# Patient Record
Sex: MALE | Race: OTHER | HISPANIC OR LATINO | Employment: OTHER | URBAN - METROPOLITAN AREA
[De-identification: names, ages, dates, MRNs, and addresses within clinical notes are randomized per-mention and may not be internally consistent; named-entity substitution may affect disease eponyms.]

---

## 2017-06-06 ENCOUNTER — TRANSCRIBE ORDERS (OUTPATIENT)
Dept: URGENT CARE | Age: 72
End: 2017-06-06

## 2017-06-06 ENCOUNTER — HOSPITAL ENCOUNTER (OUTPATIENT)
Dept: RADIOLOGY | Age: 72
Discharge: HOME | End: 2017-06-06
Payer: OTHER MISCELLANEOUS

## 2017-06-06 ENCOUNTER — OCCMED (OUTPATIENT)
Dept: URGENT CARE | Age: 72
End: 2017-06-06
Payer: OTHER MISCELLANEOUS

## 2017-06-06 DIAGNOSIS — T14.90XA INJURY: ICD-10-CM

## 2017-06-06 DIAGNOSIS — T14.90XA INJURY: Primary | ICD-10-CM

## 2017-06-06 PROCEDURE — 72100 X-RAY EXAM L-S SPINE 2/3 VWS: CPT

## 2017-06-06 PROCEDURE — G0382 LEV 3 HOSP TYPE B ED VISIT: HCPCS | Performed by: PREVENTIVE MEDICINE

## 2017-06-06 PROCEDURE — 99283 EMERGENCY DEPT VISIT LOW MDM: CPT | Performed by: PREVENTIVE MEDICINE

## 2017-06-06 PROCEDURE — 73030 X-RAY EXAM OF SHOULDER: CPT

## 2017-06-10 ENCOUNTER — APPOINTMENT (OUTPATIENT)
Dept: URGENT CARE | Age: 72
End: 2017-06-10
Payer: OTHER MISCELLANEOUS

## 2017-06-10 PROCEDURE — 99213 OFFICE O/P EST LOW 20 MIN: CPT | Performed by: FAMILY MEDICINE

## 2018-05-10 ENCOUNTER — TRANSCRIBE ORDERS (OUTPATIENT)
Dept: NEUROSURGERY | Facility: CLINIC | Age: 73
End: 2018-05-10

## 2018-05-10 DIAGNOSIS — M54.6 CHRONIC RIGHT-SIDED THORACIC BACK PAIN: Primary | ICD-10-CM

## 2018-05-10 DIAGNOSIS — G89.29 CHRONIC RIGHT-SIDED THORACIC BACK PAIN: Primary | ICD-10-CM

## 2018-05-23 ENCOUNTER — OFFICE VISIT (OUTPATIENT)
Dept: NEUROSURGERY | Facility: CLINIC | Age: 73
End: 2018-05-23
Payer: OTHER MISCELLANEOUS

## 2018-05-23 ENCOUNTER — DOCUMENTATION (OUTPATIENT)
Dept: NEUROSURGERY | Facility: CLINIC | Age: 73
End: 2018-05-23

## 2018-05-23 VITALS
TEMPERATURE: 97.7 F | HEART RATE: 70 BPM | DIASTOLIC BLOOD PRESSURE: 90 MMHG | HEIGHT: 64 IN | BODY MASS INDEX: 25.06 KG/M2 | SYSTOLIC BLOOD PRESSURE: 150 MMHG | WEIGHT: 146.8 LBS

## 2018-05-23 DIAGNOSIS — M79.651 RIGHT THIGH PAIN: ICD-10-CM

## 2018-05-23 DIAGNOSIS — IMO0001 TEAR OF RIGHT SUPRASPINATUS TENDON, INITIAL ENCOUNTER: ICD-10-CM

## 2018-05-23 DIAGNOSIS — G89.29 CHRONIC RIGHT SHOULDER PAIN: ICD-10-CM

## 2018-05-23 DIAGNOSIS — G89.29 CHRONIC LEFT-SIDED LOW BACK PAIN WITHOUT SCIATICA: ICD-10-CM

## 2018-05-23 DIAGNOSIS — M54.50 CHRONIC LEFT-SIDED LOW BACK PAIN WITHOUT SCIATICA: ICD-10-CM

## 2018-05-23 DIAGNOSIS — M47.816 LUMBAR SPONDYLOSIS: ICD-10-CM

## 2018-05-23 DIAGNOSIS — M25.511 CHRONIC RIGHT SHOULDER PAIN: ICD-10-CM

## 2018-05-23 DIAGNOSIS — R29.2 HYPERREFLEXIA: Primary | ICD-10-CM

## 2018-05-23 PROCEDURE — 99244 OFF/OP CNSLTJ NEW/EST MOD 40: CPT | Performed by: PHYSICIAN ASSISTANT

## 2018-05-23 NOTE — PATIENT INSTRUCTIONS
Have C-spine and L-spine MRIs completed  Have L-spine bending xray completed  Recommend evaluation with Orthopedics for right shoulder  Contact our office or present to hospital Emergency Room if experience worsening or new pain, sensory or motor change, bladder or bowel dysfunction, or other neurological change

## 2018-05-23 NOTE — LETTER
May 25, 2018     Rudolpho Bamberger, MD  [address]    Patient: Rebel Ricardo   YOB: 1945   Date of Visit: 5/23/2018       Dear Dr Shaw Hoyt:    Thank you for referring Rebel Ricardo to me for evaluation  Below are my notes for this consultation  If you have questions, please do not hesitate to call me  I look forward to following your patient along with you  Sincerely,        Kaden Ham PA-C        CC: No Recipients  Kaden Ham PA-C  5/24/2018  4:43 AM  Sign at close encounter  Assessment/Plan:    No problem-specific Assessment & Plan notes found for this encounter  Diagnoses and all orders for this visit:    Hyperreflexia  -     MRI cervical spine without contrast; Future  -     MRI thoracic spine without contrast; Future    Chronic left-sided low back pain without sciatica  -     XR spine lumbar minimum 4 views non injury; Future    Right thigh pain  -     XR spine lumbar minimum 4 views non injury; Future    Lumbar spondylosis  -     XR spine lumbar minimum 4 views non injury; Future    Chronic right shoulder pain  -     Ambulatory referral to Orthopedic Surgery; Future    Tear of right supraspinatus tendon, initial encounter  -     Ambulatory referral to Orthopedic Surgery; Future          Discussion/ Summary: This is a 81 y o  male  who presents for neurosurgical evaluation   Patient with complaints of right shoulder pain and low back pain since lifting a trash bag at work in June 2017  He has limited range of motion right shoulder  He demonstrates some findings of hyperreflexia on examination  Recommend further evaluation of such with MRI C-spine and MRI T-spine  L-spine MRI (10/5/17) demonstrates degenerative changes / spondylosis but no significant stenosis  Recommend bending lumbar xray to evaluate dynamic stability        An MRI Right shoulder (10/16/17) reports partial thickness tear of supraspinatus tendon and arthritic changes involving the acromioclavicular joint  He reports a right shoulder injection with pain management provider did not provide any relief of right shoulder pain  Referral for Orthopedic evaluation provided for eval of right shoulder  He is advised follow up in our office after completion of requested testing  Patient advised to contact our office or present to hospital Emergency Room if experience worsening or new pain, sensory or motor change, bladder or bowel dysfunction, or other neurological change  Patient and son expressed understanding and agreement   ___________________________________________________________________________________  Subjective:      Patient ID: Quiana Zarate is a 81 y o  male who presents for neurosurgical consultation  Patient with c/o of right shoulder pain and low back pain  Mr Yodit Soto is referred by Dr Indira Montejo (Pain Management)  Mr Yodit Soto is accompanied with his son who assisted with supplementation of history  Patient is a limited historian  Language line  #[id number] utilized for translation  HPI  Patient reports in June 2017 he began with right shoulder pain and low back pain after lifting a trash bag at work  He has L-spine MRI and right shoulder MRI completed in Oct  2017  Son reports Mr Yodit Soto participated in approximately 6 months of physical therapy with little relief and indicated Dr Indira Montejo had patient discontinue PT about 2 months ago  Patient reports a right shoulder injection did not alleviate his right shoulder pain  He denies seeing a Orthopedic provider  He reports a low back injection (steroid / marcaine /lidocaine injection L3, L4, L5, S1) on 10/26/17 provided some pain relief for a few days  Patient reports pain in right shoulder region and limited ROM with abduction of right shoulder  He denies numbness or tingling in his upper extremities      He describes constant left sided lower back pain  He reports low back pain can occur if sit, stand, or walk durations but finds most aggravating when lay down  He is unable to describe character of his back pain  He rates back pain currently as 6/10 on pain scale  PT and low back injection provided little relief of is pain  He reports waxing/waning pain in anterior left thigh region  Currently rates this pain as 6-7/10 on pain scale  Reports he is unaware of particular aggravating or alleviating factor  He denies any pain in left lower extremity distal to thigh  He denies RLE pain  He denies numbness, tinging, or weakness of lower extremities  He denies bladder or bowel dysfunction  He denies saddle paresthesia  The following portions of the patient's history were reviewed and updated as appropriate: allergies, current medications, past family history, past medical history, past social history and past surgical history  Review of Systems   Constitutional: Negative for fever  HENT: Negative  Respiratory: Negative for shortness of breath  Gastrointestinal:        Denies bowel dysfunction   Genitourinary: Negative for difficulty urinating  Musculoskeletal: Positive for back pain (low back pain)  Right shoulder pain   Neurological:        See HPI   Psychiatric/Behavioral: Negative for agitation  Objective:      /90 (BP Location: Left arm, Patient Position: Sitting, Cuff Size: Standard)   Pulse 70   Temp 97 7 °F (36 5 °C) (Tympanic)   Ht 5' 4" (1 626 m)   Wt 66 6 kg (146 lb 12 8 oz)   BMI 25 20 kg/m²           Physical Exam   Constitutional: He appears well-developed and well-nourished  No distress  Eyes: Conjunctivae are normal    Pulmonary/Chest: Effort normal    Musculoskeletal:        Right shoulder: He exhibits decreased range of motion  He exhibits no tenderness  Cervical back: He exhibits no tenderness and no deformity     Neurological:   Reflex Scores:       Tricep reflexes are 3+ on the right side and 3+ on the left side  Bicep reflexes are Right biceps reflex grade: +2-3  and Left biceps reflex grade: +2-3  Martha Mort Brachioradialis reflexes are 2+ on the right side and 2+ on the left side  Patellar reflexes are 3+ on the right side and 3+ on the left side  Achilles reflexes are 2+ on the right side  Psychiatric: He has a normal mood and affect  Neurologic Exam     Mental Status   Level of consciousness: alert    Motor Exam     Motor:  Shoulder abduction left 5/5 with FROM  Shoulder abduction right 4/5 -- limited ROM to about 70 degrees  Elbow flexion left 5/5 and right 4+/5 (right shoulder pain)  Elbow extension 5/5 bilaterally  Wrist flexion/extension 5/5 bilaterally  Finger  5/5 bilaterally  Hip flexion/abduction/adduction 5/5 bilaterally  Knee flexion/extension 5/5 bilaterally  Ankle DF/PF 5/5 bilaterally  Great toe DF 5/5 bilaterally  Sensory Exam   Vibration normal    Proprioception normal    Pinprick normal      Sensation to pinprick intact b/l UEs, torso, and b/l LEs  JPS and Vibratory sense intact b/l thumbs and great toes  Gait, Coordination, and Reflexes     Reflexes   Right brachioradialis: 2+  Left brachioradialis: 2+  Right biceps reflex grade: +2-3  Left biceps reflex grade: +2-3    Right triceps: 3+  Left triceps: 3+  Right patellar: 3+  Left patellar: 3+  Right achilles: 2+  Right plantar: normal  Left plantar: normal  Right Muñoz: absent  Left Muñoz: absent  Right ankle clonus: present  Left ankle clonus: present

## 2018-05-23 NOTE — PROGRESS NOTES
Assessment/Plan:    No problem-specific Assessment & Plan notes found for this encounter  Diagnoses and all orders for this visit:    Hyperreflexia  -     MRI cervical spine without contrast; Future  -     MRI thoracic spine without contrast; Future    Chronic left-sided low back pain without sciatica  -     XR spine lumbar minimum 4 views non injury; Future    Right thigh pain  -     XR spine lumbar minimum 4 views non injury; Future    Lumbar spondylosis  -     XR spine lumbar minimum 4 views non injury; Future    Chronic right shoulder pain  -     Ambulatory referral to Orthopedic Surgery; Future    Tear of right supraspinatus tendon, initial encounter  -     Ambulatory referral to Orthopedic Surgery; Future          Discussion/ Summary: This is a 81 y o  male  who presents for neurosurgical evaluation   Patient with complaints of right shoulder pain and low back pain since lifting a trash bag at work in June 2017  He has limited range of motion right shoulder  He demonstrates some findings of hyperreflexia on examination  Recommend further evaluation of such with MRI C-spine and MRI T-spine  L-spine MRI (10/5/17) demonstrates degenerative changes / spondylosis but no significant stenosis  Recommend bending lumbar xray to evaluate dynamic stability  An MRI Right shoulder (10/16/17) reports partial thickness tear of supraspinatus tendon and arthritic changes involving the acromioclavicular joint  He reports a right shoulder injection with pain management provider did not provide any relief of right shoulder pain  Referral for Orthopedic evaluation provided for eval of right shoulder  He is advised follow up in our office after completion of requested testing  Patient advised to contact our office or present to hospital Emergency Room if experience worsening or new pain, sensory or motor change, bladder or bowel dysfunction, or other neurological change       Patient and son expressed understanding and agreement   ___________________________________________________________________________________  Subjective:      Patient ID: Radha Munoz is a 81 y o  male who presents for neurosurgical consultation  Patient with c/o of right shoulder pain and low back pain  Mr Quinton Flores is referred by Dr Karen Monsivais (Pain Management)  Mr Quinton Flores is accompanied with his son who assisted with supplementation of history  Patient is a limited historian  Language line  #[id number] utilized for translation  HPI  Patient reports in June 2017 he began with right shoulder pain and low back pain after lifting a trash bag at work  He has L-spine MRI and right shoulder MRI completed in Oct  2017  Son reports Mr Quinton Flores participated in approximately 6 months of physical therapy with little relief and indicated Dr Karen Monsivais had patient discontinue PT about 2 months ago  Patient reports a right shoulder injection did not alleviate his right shoulder pain  He denies seeing a Orthopedic provider  He reports a low back injection (steroid / marcaine /lidocaine injection L3, L4, L5, S1) on 10/26/17 provided some pain relief for a few days  Patient reports pain in right shoulder region and limited ROM with abduction of right shoulder  He denies numbness or tingling in his upper extremities  He describes constant left sided lower back pain  He reports low back pain can occur if sit, stand, or walk durations but finds most aggravating when lay down  He is unable to describe character of his back pain  He rates back pain currently as 6/10 on pain scale  PT and low back injection provided little relief of is pain  He reports waxing/waning pain in anterior left thigh region  Currently rates this pain as 6-7/10 on pain scale  Reports he is unaware of particular aggravating or alleviating factor  He denies any pain in left lower extremity distal to thigh     He denies RLE pain     He denies numbness, tinging, or weakness of lower extremities  He denies bladder or bowel dysfunction  He denies saddle paresthesia  The following portions of the patient's history were reviewed and updated as appropriate: allergies, current medications, past family history, past medical history, past social history and past surgical history  Review of Systems   Constitutional: Negative for fever  HENT: Negative  Respiratory: Negative for shortness of breath  Gastrointestinal:        Denies bowel dysfunction   Genitourinary: Negative for difficulty urinating  Musculoskeletal: Positive for back pain (low back pain)  Right shoulder pain   Neurological:        See HPI   Psychiatric/Behavioral: Negative for agitation  Objective:      /90 (BP Location: Left arm, Patient Position: Sitting, Cuff Size: Standard)   Pulse 70   Temp 97 7 °F (36 5 °C) (Tympanic)   Ht 5' 4" (1 626 m)   Wt 66 6 kg (146 lb 12 8 oz)   BMI 25 20 kg/m²          Physical Exam   Constitutional: He appears well-developed and well-nourished  No distress  Eyes: Conjunctivae are normal    Pulmonary/Chest: Effort normal    Musculoskeletal:        Right shoulder: He exhibits decreased range of motion  He exhibits no tenderness  Cervical back: He exhibits no tenderness and no deformity  Neurological:   Reflex Scores:       Tricep reflexes are 3+ on the right side and 3+ on the left side  Bicep reflexes are Right biceps reflex grade: +2-3  and Left biceps reflex grade: +2-3  Garcia Brianne Brachioradialis reflexes are 2+ on the right side and 2+ on the left side  Patellar reflexes are 3+ on the right side and 3+ on the left side  Achilles reflexes are 2+ on the right side  Psychiatric: He has a normal mood and affect  Neurologic Exam     Mental Status   Level of consciousness: alert    Motor Exam     Motor:  Shoulder abduction left 5/5 with FROM    Shoulder abduction right 4/5 -- limited ROM to about 70 degrees  Elbow flexion left 5/5 and right 4+/5 (right shoulder pain)  Elbow extension 5/5 bilaterally  Wrist flexion/extension 5/5 bilaterally  Finger  5/5 bilaterally  Hip flexion/abduction/adduction 5/5 bilaterally  Knee flexion/extension 5/5 bilaterally  Ankle DF/PF 5/5 bilaterally  Great toe DF 5/5 bilaterally  Sensory Exam   Vibration normal    Proprioception normal    Pinprick normal      Sensation to pinprick intact b/l UEs, torso, and b/l LEs  JPS and Vibratory sense intact b/l thumbs and great toes  Gait, Coordination, and Reflexes     Reflexes   Right brachioradialis: 2+  Left brachioradialis: 2+  Right biceps reflex grade: +2-3  Left biceps reflex grade: +2-3    Right triceps: 3+  Left triceps: 3+  Right patellar: 3+  Left patellar: 3+  Right achilles: 2+  Right plantar: normal  Left plantar: normal  Right Muñoz: absent  Left Muñoz: absent  Right ankle clonus: present  Left ankle clonus: present

## 2018-06-07 ENCOUNTER — HOSPITAL ENCOUNTER (OUTPATIENT)
Dept: MRI IMAGING | Facility: HOSPITAL | Age: 73
Discharge: HOME | End: 2018-06-07
Payer: OTHER MISCELLANEOUS

## 2018-06-07 ENCOUNTER — HOSPITAL ENCOUNTER (OUTPATIENT)
Dept: MRI IMAGING | Facility: HOSPITAL | Age: 73
Discharge: HOME | End: 2018-06-07
Payer: COMMERCIAL

## 2018-06-07 DIAGNOSIS — R29.2 HYPERREFLEXIA: ICD-10-CM

## 2018-06-07 PROCEDURE — 72146 MRI CHEST SPINE W/O DYE: CPT

## 2018-06-07 PROCEDURE — 72141 MRI NECK SPINE W/O DYE: CPT

## 2018-06-08 ENCOUNTER — TELEPHONE (OUTPATIENT)
Dept: NEUROSURGERY | Facility: CLINIC | Age: 73
End: 2018-06-08

## 2018-06-08 NOTE — TELEPHONE ENCOUNTER
----- Message from Barbara Fong PA-C sent at 6/8/2018  2:19 PM EDT -----  Please call patient and arrange follow up visit (jaylin Gomez / neurosurgeon)  Please remind patient to have the previously requested Lumbar spine xray completed prior to the office visit  Thank you

## 2018-06-08 NOTE — TELEPHONE ENCOUNTER
----- Message from Radha Mohr PA-C sent at 6/8/2018  2:21 PM EDT -----  Please call patient and arrange follow up visit (jaylin Trujillo / neurosurgeon)  Please remind patient to have the previously requested Lumbar spine xray completed prior to the office visit  Thank you

## 2018-06-11 ENCOUNTER — OFFICE VISIT (OUTPATIENT)
Dept: OBGYN CLINIC | Facility: HOSPITAL | Age: 73
End: 2018-06-11
Payer: OTHER MISCELLANEOUS

## 2018-06-11 VITALS
HEIGHT: 64 IN | WEIGHT: 146.83 LBS | SYSTOLIC BLOOD PRESSURE: 191 MMHG | HEART RATE: 80 BPM | DIASTOLIC BLOOD PRESSURE: 101 MMHG | BODY MASS INDEX: 25.07 KG/M2

## 2018-06-11 DIAGNOSIS — IMO0001 TEAR OF RIGHT SUPRASPINATUS TENDON, INITIAL ENCOUNTER: ICD-10-CM

## 2018-06-11 DIAGNOSIS — G89.29 CHRONIC RIGHT SHOULDER PAIN: ICD-10-CM

## 2018-06-11 DIAGNOSIS — M75.01 ADHESIVE CAPSULITIS OF RIGHT SHOULDER: Primary | ICD-10-CM

## 2018-06-11 DIAGNOSIS — I16.0 HYPERTENSIVE URGENCY: ICD-10-CM

## 2018-06-11 DIAGNOSIS — M25.511 CHRONIC RIGHT SHOULDER PAIN: ICD-10-CM

## 2018-06-11 DIAGNOSIS — M48.02 FORAMINAL STENOSIS OF CERVICAL REGION: ICD-10-CM

## 2018-06-11 PROCEDURE — 99203 OFFICE O/P NEW LOW 30 MIN: CPT | Performed by: PHYSICIAN ASSISTANT

## 2018-06-11 NOTE — PROGRESS NOTES
Assessment/Plan   Diagnoses and all orders for this visit:    Adhesive capsulitis of right shoulder       -Follow up with Dr Violeta Marie for a surgical opinion    Tear of right supraspinatus tendon (partial), initial encounter  -     Ambulatory referral to Orthopedic Surgery    Hypertensive urgency       -To ER now  Cervical foraminal stenosis at C3-4       -Follow up with your neurosurgeon as planned          Subjective   Patient ID: Mane Quiroga is a 81 y o  male  Vitals:    06/11/18 1916   BP: (!) 191/101   Pulse: [de-identified]     82 yo male comes in for an evaluation of his right shoulder  He was injured at work about a year ago after lifting something heavy  In October, an MRI revealed a partial rotator cuff tear  He has been seeing Dr Damon Murray in pain management and reports that a shoulder injection and physical therapy have not been helpful  He is not working  He is also [facility] right sided C3-4 foraminal stenosis  He does not have a follow up scheduled yet with them, but agrees to call them today to schedule  He c/o right shoulder pain and severely decreased motion  It is difficult for him to do simple things like put on a hat or wash under his axilla  The pain occasionally radiates to the elbow and he has occasional numbness in the fingers (not now)  The shoulder pain is constant, dull, and moderate  Incidentally noted is hypertension  He has been diagnosed with that in the past but is new to the area and does not have a PCP here yet  No chest pain, headache, dizziness, or palpitations  He does c/o some blurriness especially when looking at something bright like a cell phone          The following portions of the patient's history were reviewed and updated as appropriate: allergies, current medications, past family history, past medical history, past social history, past surgical history and problem list     Review of Systems  Ortho Exam  Past Medical History:   Diagnosis Date    Low back pain     Right shoulder pain      No past surgical history on file  No family history on file  Social History     Occupational History    Not on file  Social History Main Topics    Smoking status: Never Smoker    Smokeless tobacco: Never Used    Alcohol use No    Drug use: No    Sexual activity: Not on file       Review of Systems   Constitutional: Negative  HENT: Negative  Eyes: Negative  Respiratory: Negative  Cardiovascular: Negative  Gastrointestinal: Negative  Endocrine: Negative  Genitourinary: Negative  Musculoskeletal: As below      Allergic/Immunologic: Negative  Neurological: Negative  Hematological: Negative  Psychiatric/Behavioral: Negative  Objective   Physical Exam      I have personally reviewed pertinent radiology reports in PACS   and      · Constitutional: Awake, Alert, Oriented  · Eyes: EOMI  · Psych: Mood and affect appropriate  · Heart: regular rate and rhythm  · Lungs: No audible wheezing  · Abdomen: soft  · Lymph: no lymphedema       right Shoulder:  - Appearance   No swelling, discoloration, deformity, or ecchymosis  - Palpation   No tenderness to palpation of the clavicle, AC joint, biceps tendon, scapula, or proximal humerus  - ROM   Flexion: 80, ER: 30 and IR: 0  - Motor   Internal and external rotation 5/5 with shoulder at side, flexion 5/5  - Special tests      - NVI distally

## 2018-06-11 NOTE — PATIENT INSTRUCTIONS
Call or follow up with any new or worsening symptoms as discussed  Schedule f/u visit with Neurosurg (as recommended by them) for your neck  To ER now for hypertensive urgency (blood pressure and vision)

## 2018-06-14 NOTE — TELEPHONE ENCOUNTER
06/14/2018 Called pt and spoke with him hes Citizen of [place] speaking - george spoke with him advised date and time of next apt and location and advised him he needs to get xryas prior - I also mailed him out apt card per his request

## 2018-06-14 NOTE — TELEPHONE ENCOUNTER
06/14/2018 Called pt and spoke with him hes [place] speaking - george spoke with him advised date and time of next apt and location and advised him he needs to get xryas prior - I also mailed him out apt card per his request

## 2018-07-10 ENCOUNTER — OFFICE VISIT (OUTPATIENT)
Dept: OBGYN CLINIC | Facility: MEDICAL CENTER | Age: 73
End: 2018-07-10
Payer: COMMERCIAL

## 2018-07-10 VITALS
WEIGHT: 142 LBS | BODY MASS INDEX: 24.24 KG/M2 | DIASTOLIC BLOOD PRESSURE: 85 MMHG | SYSTOLIC BLOOD PRESSURE: 157 MMHG | HEIGHT: 64 IN | HEART RATE: 65 BPM

## 2018-07-10 DIAGNOSIS — M75.01 ADHESIVE CAPSULITIS OF RIGHT SHOULDER: Primary | ICD-10-CM

## 2018-07-10 PROCEDURE — 99204 OFFICE O/P NEW MOD 45 MIN: CPT | Performed by: ORTHOPAEDIC SURGERY

## 2018-07-10 NOTE — PROGRESS NOTES
Orthopaedic Surgery - Office Note  Frankey Bolder (81 y o  male)   : 1945   MRN: [id number]  Encounter Date: 7/10/2018    Chief Complaint   Patient presents with    Right Shoulder - Pain       Assessment / Plan  Right shoulder adhesive capsulitis    · Patient was counseled and educated on his condition  We will send the patient to physical therapy to learn home exercises for right shoulder stretching and range of motion  He understands that he has continued developed a home program and do these exercises on his own  Return if symptoms worsen or fail to improve  History of Present Illness  Frankey Bolder is a 81 y o  male who presents for evaluation of right shoulder pain and stiffness  He sustained injury at work in 2017 of lifting up a trash bag  Has since had persistent right shoulder pain  It is localized to the anterior lateral aspect of shoulder is made worse with motion activity is relieved only by rest   He has developed stiffness in her shoulder and right course of physical therapy with minimal improvement and had a previous steroid injection which gave approximately 3 days of pain relief  Review of Systems  Pertinent items are noted in HPI  All other systems were reviewed and are negative  Physical Exam  /85   Pulse 65   Ht 5' 4" (1 626 m)   Wt 64 4 kg (142 lb)   BMI 24 37 kg/m²   Cons: Appears well  No apparent distress  Psych: Alert  Oriented x3  Mood and affect normal   Eyes: PERRLA, EOMI  Resp: Normal effort  No audible wheezing or stridor  CV: Palpable pulse  No discernable arrhythmia  No LE edema  Lymph:  No palpable cervical, axillary, or inguinal lymphadenopathy  Skin: Warm  No palpable masses  No visible lesions  Neuro: Normal muscle tone  Normal and symmetric DTR's  Right Shoulder Exam  Alignment / Posture:  right scapular protraction  Inspection:  No swelling  No erythema  No ecchymosis  Palpation:  acromial bursa tenderness    ROM: Shoulder FE 90  Shoulder ER 10  Shoulder IR SI joint  Shoulder AER 10  Shoulder AIR 15  Strength:  Supraspinatus 4+/5  Infraspinatus 5/5  Subscapularis 5/5  Stability:  No objective shoulder instability  Tests: (+) Neer  (+) Painful arc  (-) Bear hug  Neurovascular:  Sensation intact in Ax/R/M/U nerve distributions  Palpable radial pulse  Studies Reviewed  MRI of right shoulder - poor quality open MRI shows tendinosis of the rotator cuff tendons with no definitive tear    Procedures  No procedures today  Medical, Surgical, Family, and Social History  The patient's medical history, family history, and social history, were reviewed and updated as appropriate  Past Medical History:   Diagnosis Date    Low back pain     Right shoulder pain        History reviewed  No pertinent surgical history  Family History   Problem Relation Age of Onset    No Known Problems Mother        Social History     Occupational History    Not on file  Social History Main Topics    Smoking status: Never Smoker    Smokeless tobacco: Never Used    Alcohol use No    Drug use: No    Sexual activity: Not on file       Allergies   Allergen Reactions    Aspirin        No current outpatient prescriptions on file        Andrés Suarez MD    Scribe Attestation    I,:    am acting as a scribe while in the presence of the attending physician :        I,:    personally performed the services described in this documentation    as scribed in my presence :

## 2018-07-23 ENCOUNTER — OFFICE VISIT (OUTPATIENT)
Dept: FAMILY MEDICINE CLINIC | Facility: CLINIC | Age: 73
End: 2018-07-23
Payer: COMMERCIAL

## 2018-07-23 VITALS
BODY MASS INDEX: 24.75 KG/M2 | TEMPERATURE: 97.3 F | RESPIRATION RATE: 17 BRPM | HEIGHT: 64 IN | DIASTOLIC BLOOD PRESSURE: 90 MMHG | HEART RATE: 81 BPM | WEIGHT: 145 LBS | OXYGEN SATURATION: 99 % | SYSTOLIC BLOOD PRESSURE: 142 MMHG

## 2018-07-23 DIAGNOSIS — K42.9 UMBILICAL HERNIA WITHOUT OBSTRUCTION AND WITHOUT GANGRENE: Primary | ICD-10-CM

## 2018-07-23 PROCEDURE — 99213 OFFICE O/P EST LOW 20 MIN: CPT | Performed by: FAMILY MEDICINE

## 2018-07-23 NOTE — PROGRESS NOTES
Assessment/Plan:    Umbilical hernia without obstruction and without gangrene  - will place referral to general surgery   - no signs or symptoms of a strangulated hernia   - may take NSAIDS or tylenol for pain relief        Diagnoses and all orders for this visit:    Umbilical hernia without obstruction and without gangrene          Subjective: Patient complains of carly-umbilical pain for the past few weeks  He is unable to describe the quality of the pain  No nausea or vomiting  Pain is increased by eating  Nothing makes the pain better and he has not tried any OTC medications  Denies fever, chills, and weight loss  States that bowel movements have been normal- he goes everyday  Denies blood in the stool and melanotic stool  Denies epigastric pain and heart burn  Patient ID: Jesse Olivera is a 72 y o  male  HPI    The following portions of the patient's history were reviewed and updated as appropriate: He  has a past medical history of Low back pain and Right shoulder pain  Patient Active Problem List    Diagnosis Date Noted    Umbilical hernia without obstruction and without gangrene 07/23/2018    Low back pain 05/23/2018    Right shoulder pain 05/23/2018    Right thigh pain 05/23/2018     He  has no past surgical history on file  His family history includes No Known Problems in his mother  He  reports that he has never smoked  He has never used smokeless tobacco  He reports that he does not drink alcohol or use drugs  No current outpatient prescriptions on file  No current facility-administered medications for this visit  No current outpatient prescriptions on file prior to visit  No current facility-administered medications on file prior to visit  He is allergic to aspirin       Review of Systems   Constitutional: Negative  Respiratory: Negative for shortness of breath  Cardiovascular: Negative for chest pain and palpitations     Gastrointestinal: Positive for abdominal pain  Negative for blood in stool, constipation, diarrhea, nausea and vomiting  Genitourinary: Negative for hematuria  Skin: Negative for color change and pallor  Neurological: Negative for dizziness and headaches  Objective:      /90   Pulse 81   Temp (!) 97 3 °F (36 3 °C) (Temporal)   Resp 17   Ht 5' 4" (1 626 m)   Wt 65 8 kg (145 lb)   SpO2 99%   BMI 24 89 kg/m²          Physical Exam   Constitutional: He is oriented to person, place, and time  He appears well-developed and well-nourished  No distress  Eyes: Conjunctivae are normal  Pupils are equal, round, and reactive to light  Neck: Normal range of motion  Neck supple  Cardiovascular: Normal rate, regular rhythm and normal heart sounds  Pulmonary/Chest: Effort normal and breath sounds normal  No respiratory distress  Abdominal: Soft  Bowel sounds are normal  He exhibits no distension  There is tenderness  There is no rebound and no guarding  Patient has umbilical hernia that is able to be reduced with pressure; umbilicus becomes prominent when he goes from a lying to sitting position   Neurological: He is alert and oriented to person, place, and time  Skin: Skin is warm and dry  He is not diaphoretic

## 2018-07-23 NOTE — ASSESSMENT & PLAN NOTE
- will place referral to general surgery   - no signs or symptoms of a strangulated hernia   - may take NSAIDS or tylenol for pain relief

## 2018-07-26 ENCOUNTER — TELEPHONE (OUTPATIENT)
Dept: NEUROSURGERY | Facility: CLINIC | Age: 73
End: 2018-07-26

## 2018-07-30 ENCOUNTER — OFFICE VISIT (OUTPATIENT)
Dept: SURGERY | Facility: CLINIC | Age: 73
End: 2018-07-30
Payer: COMMERCIAL

## 2018-07-30 VITALS
TEMPERATURE: 98 F | HEIGHT: 64 IN | HEART RATE: 66 BPM | BODY MASS INDEX: 24.07 KG/M2 | WEIGHT: 141 LBS | RESPIRATION RATE: 18 BRPM | SYSTOLIC BLOOD PRESSURE: 159 MMHG | DIASTOLIC BLOOD PRESSURE: 88 MMHG

## 2018-07-30 DIAGNOSIS — K42.9 UMBILICAL HERNIA WITHOUT OBSTRUCTION AND WITHOUT GANGRENE: ICD-10-CM

## 2018-07-30 PROCEDURE — 99203 OFFICE O/P NEW LOW 30 MIN: CPT | Performed by: SURGERY

## 2018-07-30 NOTE — LETTER
July 30, 2018     Lizeth Edwards PA-C  [address]    Patient: Leonard Orozco   YOB: 1945   Date of Visit: 7/30/2018       Dear Dr Kerwin Mcfarland:    Thank you for referring Leonard Orozco to me for evaluation  Below are my notes for this consultation  If you have questions, please do not hesitate to call me  I look forward to following your patient along with you  Sincerely,        Neal Mosher MD        CC: No Recipients  Patsy Tony PA-C  7/30/2018  3:00 PM  Sign at close encounter  Assessment/Plan:   Leonard Orozco is a 72 y o male who is here for The encounter diagnosis was Umbilical hernia without obstruction and without gangrene  Patient with umbilical hernia for several years which has been increasing in pain and discomfort over the past month  Plan: open repair of Umbilical Hernia  Preoperative Clearance: None      - Patient has been instructed to avoid aspirin containing medications or non-steroidal anti-inflammatory drugs for SEVEN days preceding surgery  Imaging:      _____________________________________________      HPI:  Leonard Orozco is a 72 y o male who was referred for evaluation of Hernia (umbilical hernia)    Currently complaining of  initial Umbilical Hernia  worse with bending, lifting, standing,     no nausea and no vomiting,     regular bowel movement      Duration of pain or symptoms:  Intermittent and over 1 month      Prior abdominal surgery:   None      No results found for: WBC, HGB, HCT, MCV, PLT  No results found for: NA, K, CL, CO2, ANIONGAP, BUN, CREATININE, GLUCOSE, GLUF, CALCIUM, CORRECTEDCA, AST, ALT, ALKPHOS, PROT, ALBUMIN, BILITOT, EGFR  No results found for: INR, PROTIME        ROS:  General ROS: negative  negative for - chills, fatigue, fever or night sweats, weight loss  Respiratory ROS: no cough, shortness of breath, or wheezing  Cardiovascular ROS: no chest pain or dyspnea on exertion  Genito-Urinary ROS: no dysuria, trouble voiding, or hematuria  Musculoskeletal ROS: negative for - gait disturbance, joint pain or muscle pain  Neurological ROS: no TIA or stroke symptoms  Abdominal ROS: see HPI  GI ROS: see HPI  Skin ROS: no new rashes or lesions   Lymphatic ROS: no new adenopathy noted by pt  GYN ROS: see HPI, no new GYN history or bleeding noted  Psy ROS: no new mental or behavioral disturbances       Patient Active Problem List   Diagnosis    Low back pain    Right shoulder pain    Right thigh pain    Umbilical hernia without obstruction and without gangrene         Allergies: Aspirin    Meds:No current outpatient prescriptions on file  PMH:  Past Medical History:   Diagnosis Date    Low back pain     Right shoulder pain        PSH:  Past Surgical History:   Procedure Laterality Date    NO PAST SURGERIES         Family History   Problem Relation Age of Onset    No Known Problems Mother         reports that he has never smoked  He has never used smokeless tobacco  He reports that he does not drink alcohol or use drugs  PHYSICAL EXAM  General Appearance:    Alert, cooperative, no distress   Head:    Normocephalic without obvious abnormality   Eyes:    PERRL, conjunctiva/corneas clear, EOM's intact        Neck:   Supple, no adenopathy, no JVD   Back:     Symmetric, no spinal or CVA tenderness   Lungs:     Clear to auscultation bilaterally, no wheezing or rhonchi   Heart:    Regular rate and rhythm, S1 and S2 normal, no murmur   Abdomen:      abdomen is soft without significant tenderness, masses, organomegaly or guarding Bowel sounds are normal     umbilical hernia  The hernia is easily reducible and measures about 2 cm  Extremities:   Extremities normal  No clubbing, cyanosis or edema   Psych:   Normal Affect, AOx3  Neurologic:  Skin:   CNII-XII intact   Strength symmetric, speech intact    Warm, dry, intact, no visible rashes or lesions                   Informed consent for procedure was personally discussed, reviewed, and signed by Dr Piyush Fernandez  Discussion by Dr Piyush Fernandez was carried out regarding risks, benefits, and alternatives with the patient  Risks include but are not limited to:  bleeding, infection, and delayed wound healing or an open wound, pulmonary embolus, leaks from bowel or bile ducts or other viscus, transfusions, death  Discussed in further detail the more common complications and their rates of occurrence   was used if necessary  Patient expressed understanding of the issues discussed and wished/consented to proceed  All questions were answered by Dr Piyush Fernandez  Discussion performed between patient and the provider signing below  Signature:   Arna Simmonds, MD    Date: 7/30/2018 Time: 2:58 PM       Some portions of this record may have been generated with voice recognition software  There may be translation, syntax,  or grammatical errors  Occasional wrong word or "sound-a-like" substitutions may have occurred due to the inherent limitations of the voice recognition software  Read the chart carefully and recognize, using context, where substitutions may have occurred  If you have any questions, please contact the dictating provider for clarification or correction, as needed  This encounter has been coded by a non-certified coder

## 2018-07-30 NOTE — PROGRESS NOTES
Assessment/Plan:   Radha Munoz is a 72 y o male who is here for The encounter diagnosis was Umbilical hernia without obstruction and without gangrene  Very pleasant Malay-speaking  patient complaining of periumbilical pain associated with bulge  Plan: open repair of Umbilical Hernia  Preoperative Clearance: None      - Patient has been instructed to avoid aspirin containing medications or non-steroidal anti-inflammatory drugs for SEVEN days preceding surgery  Imaging:      _____________________________________________      HPI:  Radha Munoz is a 72 y o male who was referred for evaluation of Hernia (umbilical hernia)    Currently complaining of  initial Umbilical Hernia  worse with bending, lifting, standing,     no nausea and no vomiting,     regular bowel movement  Duration of pain or symptoms:  Intermittent and over 1 month      Prior abdominal surgery:   None      No results found for: WBC, HGB, HCT, MCV, PLT  No results found for: NA, K, CL, CO2, ANIONGAP, BUN, CREATININE, GLUCOSE, GLUF, CALCIUM, CORRECTEDCA, AST, ALT, ALKPHOS, PROT, ALBUMIN, BILITOT, EGFR  No results found for: INR, PROTIME        ROS:  General ROS: negative  negative for - chills, fatigue, fever or night sweats, weight loss  Respiratory ROS: no cough, shortness of breath, or wheezing  Cardiovascular ROS: no chest pain or dyspnea on exertion  Genito-Urinary ROS: no dysuria, trouble voiding, or hematuria  Musculoskeletal ROS: negative for - gait disturbance, joint pain or muscle pain  Neurological ROS: no TIA or stroke symptoms  Abdominal ROS: see HPI  GI ROS: see HPI  Skin ROS: no new rashes or lesions   Lymphatic ROS: no new adenopathy noted by pt     GYN ROS: see HPI, no new GYN history or bleeding noted  Psy ROS: no new mental or behavioral disturbances       Patient Active Problem List   Diagnosis    Low back pain    Right shoulder pain    Right thigh pain    Umbilical hernia without obstruction and without gangrene         Allergies: Aspirin    Meds:No current outpatient prescriptions on file  PMH:  Past Medical History:   Diagnosis Date    Low back pain     Right shoulder pain        PSH:  Past Surgical History:   Procedure Laterality Date    NO PAST SURGERIES         Family History   Problem Relation Age of Onset    No Known Problems Mother         reports that he has never smoked  He has never used smokeless tobacco  He reports that he does not drink alcohol or use drugs  PHYSICAL EXAM  General Appearance:    Alert, cooperative, no distress   Head:    Normocephalic without obvious abnormality   Eyes:    PERRL, conjunctiva/corneas clear, EOM's intact        Neck:   Supple, no adenopathy, no JVD   Back:     Symmetric, no spinal or CVA tenderness   Lungs:     Clear to auscultation bilaterally, no wheezing or rhonchi   Heart:    Regular rate and rhythm, S1 and S2 normal, no murmur   Abdomen:      abdomen is soft without significant tenderness, masses, organomegaly or guarding Bowel sounds are normal     umbilical hernia  The hernia is easily reducible and measures about 2 cm  Extremities:   Extremities normal  No clubbing, cyanosis or edema   Psych:   Normal Affect, AOx3  Neurologic:  Skin:   CNII-XII intact  Strength symmetric, speech intact    Warm, dry, intact, no visible rashes or lesions                   Informed consent for procedure was personally discussed, reviewed, and signed by Dr Raf West  Discussion by Dr Raf West was carried out regarding risks, benefits, and alternatives with the patient  Risks include but are not limited to:  bleeding, infection, and delayed wound healing or an open wound, pulmonary embolus, leaks from bowel or bile ducts or other viscus, transfusions, death  Discussed in further detail the more common complications and their rates of occurrence   was used if necessary    Patient expressed understanding of the issues discussed and wished/consented to proceed  All questions were answered by Dr Raf West  Discussion performed between patient and the provider signing below  Signature:   Lyssa Hatch MD    Date: 7/30/2018 Time: 2:58 PM       Some portions of this record may have been generated with voice recognition software  There may be translation, syntax,  or grammatical errors  Occasional wrong word or "sound-a-like" substitutions may have occurred due to the inherent limitations of the voice recognition software  Read the chart carefully and recognize, using context, where substitutions may have occurred  If you have any questions, please contact the dictating provider for clarification or correction, as needed  This encounter has been coded by a non-certified coder

## 2018-08-08 ENCOUNTER — ANESTHESIA EVENT (OUTPATIENT)
Dept: PERIOP | Facility: HOSPITAL | Age: 73
End: 2018-08-08
Payer: COMMERCIAL

## 2018-08-08 NOTE — PRE-PROCEDURE INSTRUCTIONS
No outpatient prescriptions have been marked as taking for the 8/9/18 encounter [facility] Encounter)

## 2018-08-09 ENCOUNTER — ANESTHESIA (OUTPATIENT)
Dept: PERIOP | Facility: HOSPITAL | Age: 73
End: 2018-08-09
Payer: COMMERCIAL

## 2018-08-09 ENCOUNTER — HOSPITAL ENCOUNTER (OUTPATIENT)
Facility: HOSPITAL | Age: 73
Setting detail: OUTPATIENT SURGERY
Discharge: HOME | End: 2018-08-09
Attending: SURGERY | Admitting: SURGERY
Payer: COMMERCIAL

## 2018-08-09 VITALS
BODY MASS INDEX: 24.75 KG/M2 | SYSTOLIC BLOOD PRESSURE: 155 MMHG | DIASTOLIC BLOOD PRESSURE: 86 MMHG | RESPIRATION RATE: 14 BRPM | OXYGEN SATURATION: 97 % | HEART RATE: 65 BPM | HEIGHT: 64 IN | TEMPERATURE: 97.7 F | WEIGHT: 145 LBS

## 2018-08-09 DIAGNOSIS — K42.9 UMBILICAL HERNIA WITHOUT OBSTRUCTION AND WITHOUT GANGRENE: Primary | ICD-10-CM

## 2018-08-09 PROCEDURE — 49585 PR REPAIR UMBILICAL HERN,5+Y/O,REDUC: CPT | Performed by: PHYSICIAN ASSISTANT

## 2018-08-09 PROCEDURE — 51798 US URINE CAPACITY MEASURE: CPT | Performed by: SURGERY

## 2018-08-09 PROCEDURE — 49585 PR REPAIR UMBILICAL HERN,5+Y/O,REDUC: CPT | Performed by: SURGERY

## 2018-08-09 PROCEDURE — C1781 MESH (IMPLANTABLE): HCPCS | Performed by: SURGERY

## 2018-08-09 DEVICE — BARD VENTRALEX HERNIA PATCH, 4.3 CM (1.7"), SMALL CIRCLE WITH STRAP
Type: IMPLANTABLE DEVICE | Site: UMBILICAL | Status: FUNCTIONAL
Brand: VENTRALEX

## 2018-08-09 RX ORDER — ONDANSETRON 4 MG/1
4 TABLET, ORALLY DISINTEGRATING ORAL EVERY 4 HOURS PRN
Status: DISCONTINUED | OUTPATIENT
Start: 2018-08-09 | End: 2018-08-09 | Stop reason: HOSPADM

## 2018-08-09 RX ORDER — KETOROLAC TROMETHAMINE 30 MG/ML
INJECTION, SOLUTION INTRAMUSCULAR; INTRAVENOUS AS NEEDED
Status: DISCONTINUED | OUTPATIENT
Start: 2018-08-09 | End: 2018-08-09 | Stop reason: SURG

## 2018-08-09 RX ORDER — GLYCOPYRROLATE 0.2 MG/ML
INJECTION INTRAMUSCULAR; INTRAVENOUS AS NEEDED
Status: DISCONTINUED | OUTPATIENT
Start: 2018-08-09 | End: 2018-08-09 | Stop reason: SURG

## 2018-08-09 RX ORDER — HYDROCODONE BITARTRATE AND ACETAMINOPHEN 5; 325 MG/1; MG/1
1 TABLET ORAL EVERY 4 HOURS PRN
Status: DISCONTINUED | OUTPATIENT
Start: 2018-08-09 | End: 2018-08-09 | Stop reason: HOSPADM

## 2018-08-09 RX ORDER — DEXAMETHASONE SODIUM PHOSPHATE 4 MG/ML
INJECTION, SOLUTION INTRA-ARTICULAR; INTRALESIONAL; INTRAMUSCULAR; INTRAVENOUS; SOFT TISSUE AS NEEDED
Status: DISCONTINUED | OUTPATIENT
Start: 2018-08-09 | End: 2018-08-09 | Stop reason: SURG

## 2018-08-09 RX ORDER — ONDANSETRON 2 MG/ML
INJECTION INTRAMUSCULAR; INTRAVENOUS AS NEEDED
Status: DISCONTINUED | OUTPATIENT
Start: 2018-08-09 | End: 2018-08-09 | Stop reason: SURG

## 2018-08-09 RX ORDER — ONDANSETRON 2 MG/ML
4 INJECTION INTRAMUSCULAR; INTRAVENOUS EVERY 4 HOURS PRN
Status: DISCONTINUED | OUTPATIENT
Start: 2018-08-09 | End: 2018-08-09 | Stop reason: HOSPADM

## 2018-08-09 RX ORDER — MEPERIDINE HYDROCHLORIDE 50 MG/ML
12.5 INJECTION INTRAMUSCULAR; INTRAVENOUS; SUBCUTANEOUS ONCE AS NEEDED
Status: DISCONTINUED | OUTPATIENT
Start: 2018-08-09 | End: 2018-08-09 | Stop reason: HOSPADM

## 2018-08-09 RX ORDER — HYDROCODONE BITARTRATE AND ACETAMINOPHEN 5; 325 MG/1; MG/1
1-2 TABLET ORAL EVERY 4 HOURS PRN
Qty: 30 TABLET | Refills: 0 | Status: SHIPPED | OUTPATIENT
Start: 2018-08-09 | End: 2018-09-07

## 2018-08-09 RX ORDER — ROCURONIUM BROMIDE 10 MG/ML
INJECTION, SOLUTION INTRAVENOUS AS NEEDED
Status: DISCONTINUED | OUTPATIENT
Start: 2018-08-09 | End: 2018-08-09 | Stop reason: SURG

## 2018-08-09 RX ORDER — ONDANSETRON 2 MG/ML
4 INJECTION INTRAMUSCULAR; INTRAVENOUS ONCE AS NEEDED
Status: DISCONTINUED | OUTPATIENT
Start: 2018-08-09 | End: 2018-08-09 | Stop reason: HOSPADM

## 2018-08-09 RX ORDER — FENTANYL CITRATE 50 UG/ML
INJECTION, SOLUTION INTRAMUSCULAR; INTRAVENOUS AS NEEDED
Status: DISCONTINUED | OUTPATIENT
Start: 2018-08-09 | End: 2018-08-09 | Stop reason: SURG

## 2018-08-09 RX ORDER — PROPOFOL 10 MG/ML
INJECTION, EMULSION INTRAVENOUS AS NEEDED
Status: DISCONTINUED | OUTPATIENT
Start: 2018-08-09 | End: 2018-08-09 | Stop reason: SURG

## 2018-08-09 RX ORDER — DEXTROSE AND SODIUM CHLORIDE 5; .45 G/100ML; G/100ML
80 INJECTION, SOLUTION INTRAVENOUS CONTINUOUS
Status: DISCONTINUED | OUTPATIENT
Start: 2018-08-09 | End: 2018-08-09 | Stop reason: HOSPADM

## 2018-08-09 RX ORDER — MORPHINE SULFATE 2 MG/ML
2 INJECTION, SOLUTION INTRAMUSCULAR; INTRAVENOUS EVERY 2 HOUR PRN
Status: DISCONTINUED | OUTPATIENT
Start: 2018-08-09 | End: 2018-08-09 | Stop reason: HOSPADM

## 2018-08-09 RX ORDER — SODIUM CHLORIDE 9 MG/ML
125 INJECTION, SOLUTION INTRAVENOUS CONTINUOUS
Status: DISCONTINUED | OUTPATIENT
Start: 2018-08-09 | End: 2018-08-09 | Stop reason: HOSPADM

## 2018-08-09 RX ORDER — MIDAZOLAM HYDROCHLORIDE 1 MG/ML
INJECTION INTRAMUSCULAR; INTRAVENOUS AS NEEDED
Status: DISCONTINUED | OUTPATIENT
Start: 2018-08-09 | End: 2018-08-09 | Stop reason: SURG

## 2018-08-09 RX ORDER — ACETAMINOPHEN 325 MG/1
650 TABLET ORAL EVERY 6 HOURS PRN
Status: DISCONTINUED | OUTPATIENT
Start: 2018-08-09 | End: 2018-08-09 | Stop reason: HOSPADM

## 2018-08-09 RX ORDER — FENTANYL CITRATE/PF 50 MCG/ML
50 SYRINGE (ML) INJECTION
Status: DISCONTINUED | OUTPATIENT
Start: 2018-08-09 | End: 2018-08-09 | Stop reason: HOSPADM

## 2018-08-09 RX ADMIN — SODIUM CHLORIDE 125 ML/HR: 0.9 INJECTION, SOLUTION INTRAVENOUS at 10:40

## 2018-08-09 RX ADMIN — FENTANYL CITRATE 50 MCG: 50 INJECTION, SOLUTION INTRAMUSCULAR; INTRAVENOUS at 07:44

## 2018-08-09 RX ADMIN — SODIUM CHLORIDE 125 ML/HR: 0.9 INJECTION, SOLUTION INTRAVENOUS at 06:20

## 2018-08-09 RX ADMIN — NEOSTIGMINE METHYLSULFATE 3 MG: 1 INJECTION, SOLUTION INTRAMUSCULAR; INTRAVENOUS; SUBCUTANEOUS at 08:18

## 2018-08-09 RX ADMIN — MIDAZOLAM 2 MG: 1 INJECTION INTRAMUSCULAR; INTRAVENOUS at 07:24

## 2018-08-09 RX ADMIN — PROPOFOL 200 MG: 10 INJECTION, EMULSION INTRAVENOUS at 07:28

## 2018-08-09 RX ADMIN — FENTANYL CITRATE 50 MCG: 50 INJECTION INTRAMUSCULAR; INTRAVENOUS at 08:59

## 2018-08-09 RX ADMIN — LIDOCAINE HYDROCHLORIDE 80 MG: 20 INJECTION, SOLUTION INTRAVENOUS at 07:28

## 2018-08-09 RX ADMIN — ROCURONIUM BROMIDE 35 MG: 10 INJECTION INTRAVENOUS at 07:28

## 2018-08-09 RX ADMIN — SODIUM CHLORIDE: 0.9 INJECTION, SOLUTION INTRAVENOUS at 07:52

## 2018-08-09 RX ADMIN — KETOROLAC TROMETHAMINE 30 MG: 30 INJECTION, SOLUTION INTRAMUSCULAR at 08:01

## 2018-08-09 RX ADMIN — GLYCOPYRROLATE 0.6 MG: 0.2 INJECTION, SOLUTION INTRAMUSCULAR; INTRAVENOUS at 08:18

## 2018-08-09 RX ADMIN — DEXAMETHASONE SODIUM PHOSPHATE 4 MG: 4 INJECTION, SOLUTION INTRAMUSCULAR; INTRAVENOUS at 07:39

## 2018-08-09 RX ADMIN — FENTANYL CITRATE 50 MCG: 50 INJECTION, SOLUTION INTRAMUSCULAR; INTRAVENOUS at 07:28

## 2018-08-09 RX ADMIN — CEFAZOLIN SODIUM 1000 MG: 1 SOLUTION INTRAVENOUS at 07:33

## 2018-08-09 RX ADMIN — ONDANSETRON HYDROCHLORIDE 4 MG: 2 INJECTION, SOLUTION INTRAVENOUS at 07:24

## 2018-08-09 NOTE — NURSING NOTE
9035-Spoke to Ronny POWER and advised pt voided 50cc then unmeasured urine in the toilet while he was trying to have bm  Per Lisa Masters, allow pt to go home and if he doesn't urinate within 6hrs to have him come back to ER

## 2018-08-09 NOTE — DISCHARGE INSTRUCTIONS
[facility] Instructions  Dr Claudia Felton MD, FACS    1  General: You will feel pulling sensations around the wound or funny aches and pains around the incisions  This is normal  Even minor surgery is a change in your body and this is your bodys way of reaction to it  If you have had abdominal surgery, it may help to support the incision with a small pillow or blanket for comfort when moving or coughing  2  Wound care: Make sure to remove the bandage in about 24 hours, unless instructed otherwise  You usually don't have to redress the wound after 24-48 hours, unless for comfort  Keep the incision clean and dry  Let air get to it  If this Steri-Strips fall off, just keep the wound clean  3  Water: You may shower over the wound, unless there are drain tubes left in place  Do not bathe or use a pool or hot tub until cleared by the physician  You may shower right over the staples or Steri-Strips and packing dry when you are done  4  Activity: You may go up and down stairs, walk as much as you are comfortable, but walk at least 3 times each day  If you have had abdominal surgery, do not lift anything heavier than 15 pounds for at least 2-4 weeks, unless cleared by the doctor  5  Diet: You may resume a regular diet  If you had a same-day surgery or overnight stay surgery, you may wish to eat lightly for a few days: soups, crackers, and sandwiches  You may resume a regular diet when ready  6  Medications: Resume all of your previous medications, unless told otherwise by the doctor  Avoid aspirin or ibuprofen (Advil, Motrin, etc ) products for 2-3 days after the date of surgery  You may, at that time, began to take them again  Tylenol is always fine, unless you are taking any narcotic pain medication containing Tylenol (such as Percocet, Darvocet, Vicodin, or anything containing acetaminophen)  Do not take Tylenol if you're taking these medications   You do not need to take the narcotic pain medications unless you are having significant pain and discomfort  7  Driving: You will need someone to drive you home on the day of surgery  Do not drive or make any important decisions while on narcotic pain medication or 24 hours and after anesthesia or sedation for surgery  Generally, you may drive when your off all narcotic pain medications  8  Upset Stomach: You may take Maalox, Tums, or similar items for an upset stomach  If your narcotic pain medication causes an upset stomach, do not take it on an empty stomach  Try taking it with at least some crackers or toast      9  Constipation: Patients often experienced constipation after surgery  You may take over-the-counter medication for this, such as Metamucil, Senokot, Dulcolax, milk of magnesia, etc  You may take a suppository unless you have had anorectal surgery such as a procedure on your hemorrhoids  If you experience significant nausea or vomiting after abdominal surgery, call the office before trying any of these medications  10  Call the office: If you are experiencing any of the following, fevers above 101 5°, significant nausea or vomiting, if the wound develops drainage and/or is excessive redness around the wound, or if you have significant diarrhea or other worsening symptoms  11  Pain: You may be given a prescription for pain  This will be given to the hospital, the day of surgery  12  Sexual Activity: You may resume sexual activity when you feel ready and comfortable and your incision is sealed and healed without apparent infection risk  13  Urination: If you haven't urinated in 6 hours, go directly to the ER for evaluation for urinary retention       [address]  [place], [address]                                                                                                           Phone: [phone]

## 2018-08-09 NOTE — DISCHARGE SUMMARY
Discharge Summary - Rebel Ricardo 72 y o  male MRN: [id number]    Unit/Bed#: OR [place] Encounter: [id number]      Pre-Operative Diagnosis: Pre-Op Diagnosis Codes:     * Umbilical hernia without obstruction and without gangrene [K42 9]    Post-Operative Diagnosis: Post-Op Diagnosis Codes:     * Umbilical hernia without obstruction and without gangrene [K42 9]    Procedures Performed:  Procedure(s):  REPAIR HERNIA UMBILICAL    Surgeon: Samir Trujillo MD    See H & P for full details of admission and Operative Note for full details of operations performed  Patient was seen and examined prior to discharge  Provisions for Follow-Up Care:  See After Visit Summary for information related to follow-up care and home orders  Disposition: Home, in stable condition  Planned Readmission: No    Discharge Medications:  See after visit summary for reconciled discharge medications provided to patient and family  Post Operative instructions: Reviewed with patient and/or family  Some portions of this record may have been generated with voice recognition software  There may be translation, syntax,  or grammatical errors  Occasional wrong word or "sound-a-like" substitutions may have occurred due to the inherent limitations of the voice recognition software  Read the chart carefully and recognize, using context, where substitutions may have occurred  If you have any questions, please contact the dictating provider for clarification or correction, as needed  This encounter has been coded by non certified Coder      Signature:   Samir Trujillo MD  Date: 8/9/2018 Time: 8:23 AM

## 2018-08-09 NOTE — ANESTHESIA PREPROCEDURE EVALUATION
Review of Systems/Medical History  Patient summary reviewed  Chart reviewed  No history of anesthetic complications     Cardiovascular  Hypertension ,    Pulmonary  Negative pulmonary ROS        GI/Hepatic  Negative GI/hepatic ROS          Negative  ROS        Endo/Other  Negative endo/other ROS      GYN       Hematology  Negative hematology ROS      Musculoskeletal  Back pain , lumbar pain,        Neurology   Psychology   Negative psychology ROS              Physical Exam    Airway    Mallampati score: II  TM Distance: >3 FB  Neck ROM: full     Dental       Cardiovascular  Rhythm: regular, Rate: normal, Cardiovascular exam normal    Pulmonary  Pulmonary exam normal Breath sounds clear to auscultation,     Other Findings        Anesthesia Plan  ASA Score- 2     Anesthesia Type-     Plan Factors-Patient not instructed to abstain from smoking on day of procedure  Patient did not smoke on day of surgery  Induction- intravenous  Postoperative Plan- Plan for postoperative opioid use  Informed Consent- Anesthetic plan and risks discussed with patient (DAUGHTER-IN-LAW TRANSLATING)

## 2018-08-09 NOTE — OP NOTE
REPAIR HERNIA UMBILICAL  Postoperative Note  PATIENT NAME: Jacek Maher  : 1945  MRN: [id number]  AL OR ROOM 07    Surgery Date: 2018      Consent:  The risks, benefits, and alternatives to the surgery were discussed with the patient and with the family prior to surgery, personally by Dr Kristyn Dawkins  If the consent was obtained by the physician assistant or other representative, the consent was reviewed once again personally by the operating physician  Common complications particular for this procedure as well as unusual complications were discussed, including but not limited to:  bleeding, wound infection, prolonged wound healing, open wounds, reoperation, leak from the bowel or viscus, leak from the bile duct or injury to adjacent or other organs or blood vessels in the abdomen  A  was used if necessary  The patient expressed understanding of the issues discussed and wished and consented to the procedure to proceed  All questions were answered  Dr Kristyn Dawkins personally discussed the informed consent with this patient  Pre operative diagnosis:   Umbilical hernia without obstruction and without gangrene [K42 9]    Operative Indications:  Symptomatic Umbilical Hernia    Operative Findings:  Small defect    Post operative diagnosis:  Post-Op Diagnosis Codes:     * Umbilical hernia without obstruction and without gangrene [K42 9]    Procedure:  Procedure(s):  REPAIR HERNIA UMBILICAL    Surgeon(s) and Role:     * Song Maki MD - Primary     * Ga Rai PA-C - Assisting    The Physician Assistant was medically necessary for surgical safety the case including suturing, retraction, and hemostasis  No qualified resident was available  I was present for the entire procedure  Drains:       Specimens:  * No specimens in log *    Estimated Blood Loss:   0 mL    Anesthesia Type:   Choice     Procedure: The patient was seen in the Holding Room   The risks, benefits, complications, treatment options, and expected outcomes were discussed with the patient  The possibilities of reaction to medication, pulmonary aspiration, perforation of viscus, bleeding, recurrent infection, the need for additional procedures, failure to diagnose a condition, and creating a complication requiring transfusion or operation were discussed with the patient  The patient concurred with the proposed plan, giving informed consent  The site of surgery properly noted/marked  The patient was taken to Operating Room  A Time Out was held and the above information confirmed  The patient was prepped and draped in a sterile fashion  An additional timeout was performed  An infraumbilical incision was made, dissection carried out to the hernia sac  Hernia sac was freed of its surrounding adhesions  The hernia sac was opened and its contents reduced  The sac was suture-ligated at its base using 2-0 Vicryl sutures  Excess hernia sac was excised and sent for pathology evaluation     If possible, the hernia sac was closed and a preperitoneal blunt dissection was carried out,  to place the patch in a Pre-Peritoneal  postion  A Ventralex mesh was placed into the defect and secured using interrupted 1 Prolene sutures in a circumferential fashion,  assuring there were no defects or gaps in the mesh  This was doubly checked to secure the mesh without any interrupting gaps or defects  The fascia was closed using a 4-1 technique using an 1 Prolene suture, incorporating the mesh in the fascia closure  The umbilicus was re-created using interrupted 2-0 Vicryl sutures  The subcutaneous tissues were closed using 2-0 and 3-0 Vicryl sutures and the skin closed using a 4-0 Monocryl subcuticular stitch  The wound was dressed, placing positive pressure in the umbilicus with a gauze pack  The wound was dressed with Steri-Strips and dressing  The patient tolerated the procedure well      A small Ventralex mesh was used in the repair  Instrument, sponge, and needle counts were correct prior to closure and at the conclusion of the case  Some portions of this records may have been generated with voice recognition software  There may be translation, syntax,  or grammatical errors  Occasional wrong word or "sound-a-like" substitutions may have occurred due to the inherent limitations of the voice recognition software  Read the chart carefully and recognize, using context, where substations may have occurred  If you have any questions, please contact the dictating provider for clarification or correction, as needed       Complications: None    Condition: Stable to PACU    SIGNATURE: Samir Trujillo MD   DATE: August 9, 2018   TIME: 8:23 AM

## 2018-08-09 NOTE — NURSING NOTE
1410-Pt stated he requested to go home  He stated he was urinating in little increments  Pt and daughter in law were both advised to come back to ER should he not be able to urinate at all or feels uncomfortable  Pt and daughter in law stated they understood   Pt urinated x2 in toilet unmeasured occurance after he voided 50cc in urinal

## 2018-08-09 NOTE — H&P (VIEW-ONLY)
Assessment/Plan:   Nii Rodriguez is a 72 y o male who is here for The encounter diagnosis was Umbilical hernia without obstruction and without gangrene  Very pleasant Bulgarian-speaking  patient complaining of periumbilical pain associated with bulge  Plan: open repair of Umbilical Hernia  Preoperative Clearance: None      - Patient has been instructed to avoid aspirin containing medications or non-steroidal anti-inflammatory drugs for SEVEN days preceding surgery  Imaging:      _____________________________________________      HPI:  Nii Rodriguez is a 72 y o male who was referred for evaluation of Hernia (umbilical hernia)    Currently complaining of  initial Umbilical Hernia  worse with bending, lifting, standing,     no nausea and no vomiting,     regular bowel movement  Duration of pain or symptoms:  Intermittent and over 1 month      Prior abdominal surgery:   None      No results found for: WBC, HGB, HCT, MCV, PLT  No results found for: NA, K, CL, CO2, ANIONGAP, BUN, CREATININE, GLUCOSE, GLUF, CALCIUM, CORRECTEDCA, AST, ALT, ALKPHOS, PROT, ALBUMIN, BILITOT, EGFR  No results found for: INR, PROTIME        ROS:  General ROS: negative  negative for - chills, fatigue, fever or night sweats, weight loss  Respiratory ROS: no cough, shortness of breath, or wheezing  Cardiovascular ROS: no chest pain or dyspnea on exertion  Genito-Urinary ROS: no dysuria, trouble voiding, or hematuria  Musculoskeletal ROS: negative for - gait disturbance, joint pain or muscle pain  Neurological ROS: no TIA or stroke symptoms  Abdominal ROS: see HPI  GI ROS: see HPI  Skin ROS: no new rashes or lesions   Lymphatic ROS: no new adenopathy noted by pt     GYN ROS: see HPI, no new GYN history or bleeding noted  Psy ROS: no new mental or behavioral disturbances       Patient Active Problem List   Diagnosis    Low back pain    Right shoulder pain    Right thigh pain    Umbilical hernia without obstruction and without gangrene         Allergies: Aspirin    Meds:No current outpatient prescriptions on file  PMH:  Past Medical History:   Diagnosis Date    Low back pain     Right shoulder pain        PSH:  Past Surgical History:   Procedure Laterality Date    NO PAST SURGERIES         Family History   Problem Relation Age of Onset    No Known Problems Mother         reports that he has never smoked  He has never used smokeless tobacco  He reports that he does not drink alcohol or use drugs  PHYSICAL EXAM  General Appearance:    Alert, cooperative, no distress   Head:    Normocephalic without obvious abnormality   Eyes:    PERRL, conjunctiva/corneas clear, EOM's intact        Neck:   Supple, no adenopathy, no JVD   Back:     Symmetric, no spinal or CVA tenderness   Lungs:     Clear to auscultation bilaterally, no wheezing or rhonchi   Heart:    Regular rate and rhythm, S1 and S2 normal, no murmur   Abdomen:      abdomen is soft without significant tenderness, masses, organomegaly or guarding Bowel sounds are normal     umbilical hernia  The hernia is easily reducible and measures about 2 cm  Extremities:   Extremities normal  No clubbing, cyanosis or edema   Psych:   Normal Affect, AOx3  Neurologic:  Skin:   CNII-XII intact  Strength symmetric, speech intact    Warm, dry, intact, no visible rashes or lesions                   Informed consent for procedure was personally discussed, reviewed, and signed by Dr Jenni Pryor  Discussion by Dr Jenni Pryor was carried out regarding risks, benefits, and alternatives with the patient  Risks include but are not limited to:  bleeding, infection, and delayed wound healing or an open wound, pulmonary embolus, leaks from bowel or bile ducts or other viscus, transfusions, death  Discussed in further detail the more common complications and their rates of occurrence   was used if necessary    Patient expressed understanding of the issues discussed and wished/consented to proceed  All questions were answered by Dr Seymour Sanders  Discussion performed between patient and the provider signing below  Signature:   Yordan Merida MD    Date: 7/30/2018 Time: 2:58 PM       Some portions of this record may have been generated with voice recognition software  There may be translation, syntax,  or grammatical errors  Occasional wrong word or "sound-a-like" substitutions may have occurred due to the inherent limitations of the voice recognition software  Read the chart carefully and recognize, using context, where substitutions may have occurred  If you have any questions, please contact the dictating provider for clarification or correction, as needed  This encounter has been coded by a non-certified coder

## 2018-08-10 ENCOUNTER — TELEPHONE (OUTPATIENT)
Dept: SURGERY | Facility: CLINIC | Age: 73
End: 2018-08-10

## 2018-08-10 ENCOUNTER — HOSPITAL ENCOUNTER (EMERGENCY)
Facility: HOSPITAL | Age: 73
Discharge: HOME | End: 2018-08-10
Payer: COMMERCIAL

## 2018-08-10 VITALS
WEIGHT: 145.5 LBS | BODY MASS INDEX: 24.98 KG/M2 | OXYGEN SATURATION: 98 % | HEART RATE: 60 BPM | SYSTOLIC BLOOD PRESSURE: 180 MMHG | RESPIRATION RATE: 16 BRPM | DIASTOLIC BLOOD PRESSURE: 92 MMHG | TEMPERATURE: 97 F

## 2018-08-10 DIAGNOSIS — K59.00 CONSTIPATION: ICD-10-CM

## 2018-08-10 DIAGNOSIS — R33.9 URINARY RETENTION: ICD-10-CM

## 2018-08-10 DIAGNOSIS — K42.9 UMBILICAL HERNIA WITHOUT OBSTRUCTION AND WITHOUT GANGRENE: Primary | ICD-10-CM

## 2018-08-10 DIAGNOSIS — N39.0 URINARY TRACT INFECTION: ICD-10-CM

## 2018-08-10 LAB
BACTERIA UR QL AUTO: ABNORMAL /HPF
BILIRUB UR QL STRIP: NEGATIVE
CLARITY UR: CLEAR
COLOR UR: YELLOW
GLUCOSE UR STRIP-MCNC: NEGATIVE MG/DL
HGB UR QL STRIP.AUTO: ABNORMAL
KETONES UR STRIP-MCNC: NEGATIVE MG/DL
LEUKOCYTE ESTERASE UR QL STRIP: ABNORMAL
NITRITE UR QL STRIP: NEGATIVE
NON-SQ EPI CELLS URNS QL MICRO: ABNORMAL /HPF
PH UR STRIP.AUTO: 6 [PH] (ref 4.5–8)
PROT UR STRIP-MCNC: NEGATIVE MG/DL
RBC #/AREA URNS AUTO: ABNORMAL /HPF
SP GR UR STRIP.AUTO: 1.01 (ref 1–1.03)
UROBILINOGEN UR QL STRIP.AUTO: 0.2 E.U./DL
WBC #/AREA URNS AUTO: ABNORMAL /HPF

## 2018-08-10 PROCEDURE — 87086 URINE CULTURE/COLONY COUNT: CPT

## 2018-08-10 PROCEDURE — 81001 URINALYSIS AUTO W/SCOPE: CPT

## 2018-08-10 PROCEDURE — 99283 EMERGENCY DEPT VISIT LOW MDM: CPT

## 2018-08-10 RX ORDER — CIPROFLOXACIN 250 MG/1
250 TABLET, FILM COATED ORAL EVERY 12 HOURS SCHEDULED
Qty: 14 TABLET | Refills: 0 | Status: SHIPPED | OUTPATIENT
Start: 2018-08-10 | End: 2018-08-17

## 2018-08-10 RX ORDER — DOCUSATE SODIUM 100 MG/1
100 CAPSULE, LIQUID FILLED ORAL 2 TIMES DAILY
Qty: 20 CAPSULE | Refills: 0 | Status: SHIPPED | OUTPATIENT
Start: 2018-08-10 | End: 2018-09-07

## 2018-08-10 RX ADMIN — MAGNESIUM HYDROXIDE 30 ML: 400 SUSPENSION ORAL at 14:12

## 2018-08-10 NOTE — DISCHARGE INSTRUCTIONS
Retención urinaria en hombres   LO QUE NECESITA SABER:   La retención urinaria es valentino condición que se presenta cuando la vejiga no se vacía completamente al [place]  INSTRUCCIONES SOBRE [facility]ITAL[facility]:   Medicamentos:   · Medicamentos,  puede ayudar a disminuir el tamaño de la próstata, combatir las infecciones y lo ayudan a orinar con más facilidad  · [place] basia medicamentos jens se le haya indicado  Consulte con rangel médico si usted malgorzata que rangel medicamento no le está ayudando o si presenta efectos secundarios  Infórmele si es alérgico a cualquier medicamento  Mantenga valentino lista actualizada de los [place], las vitaminas y los productos herbales que kaylah  Incluya los siguientes datos de los medicamentos: cantidad, frecuencia y motivo de administración  Traiga con usted la lista o los envases de la píldoras a basia citas de seguimiento  Lleve la lista de los medicamentos con usted en perry de valentino emergencia  Cuidado de la sonda de [place]:  Es posible que necesite un catéter de [place] luz un christelle de 2 semanas en casa  [employer] le darán valentino bolsa para la pierna más pequeña para recolectar la orina  Mantenga la bolsa debajo del nivel de rangel cintura  [place] va a prevenir que la orina fluya de regreso a rangel vejiga y cause valentino infección u otros problemas  También mantenga el tubo sin torceduras para que la orina drene apropiadamente  No jale del catéter  [place] puede provocarle dolor y sangrado y podría provocar que el catéter se salga  Consulte con rangel médico o urólogo para obtener [facility] cuidado de la sonda de [place]  Orine con frecuencia:  Cuando le retiren el catéter, no espere a tener la vejiga demasiado llena para orinar  Fije horarios regulares cada día para orinar  Orine shafer pronto sienta la necesidad de [place], o al menos cada 3 horas cuando esté despierto  No connor líquidos antes de irse a dormir  Orine cisco antes de irse a la cama    Programe valentino aminta con rangel médico o urólogo jens se le indique:  Anote basia preguntas para que se acuerde de hacerlas luz basia visitas  Comuníquese con upton médico o urólogo si:   · Usted tiene fiebre  · Siente dolor al [place] Lux  · Usted orina con destinee  · Usted tiene problemas con el catéter  · Usted tiene preguntas o inquietudes acerca de upton condición o cuidado  Regrese a la doug de emergencias si:   · Usted tiene dolor abdominal intenso  · Usted está respirando más rápido que lo normal     · [place] ritmo cardíaco es más rápido de lo normal     · [place] patricia, vahe, pies o tobillos están inflamados  © 2017 [address] [employer] Information is for End User's use only and may not be sold, redistributed or otherwise used for commercial purposes  All illustrations and images included in CareNotes® are the copyrighted property of A D A M , Inc  or Martir Hull  Esta información es sólo para uso en educación  [place] intención no es darle un consejo médico sobre enfermedades o tratamientos  Colsulte con upton Kelley Cranker farmacéutico antes de seguir cualquier régimen médico para saber si es seguro y efectivo para usted  Estreñimiento   LO QUE NECESITA SABER:   El estreñimiento sucede cuando usted tiene evacuaciones intestinales duras y secas o pasa más tiempo del normal entre ellas  INSTRUCCIONES SOBRE [facility]:   Regrese a la doug de emergencias si:   · Usted tiene destinee en upton evacuaciones intestinales  · Usted tiene fiebre y dolor abdominal con el estreñimiento  Pregúntele a upton Vee Rumps vitaminas y minerales son adecuados para usted  · El estreñimiento empeora  · Usted comienza a vomitar  · Usted tiene preguntas o inquietudes acerca de [place] condición o cuidado  Medicamentos:   · Un medicamento o un suplemento de fibra  podría ayudarle a ablandar las evacuaciones intestinales   Un laxante podría aflojar o ayudar a relajar basia intestinos para que pueda tener valentino evacuación intestinal  Janette Clear es probable que le den [place] para aumentar el líquido en basia intestinos  El líquido puede llegar a movilizar las evacuaciones intestinales a través de basia intestinos  · [place] basia medicamentos jens se le haya indicado  Consulte con rangel médico si usted malgorzata que rangel medicamento no le está ayudando o si presenta efectos secundarios  Infórmele si es alérgico a algún medicamento  Mantenga valentino lista actualizada de los [place], las vitaminas y los productos herbales que kaylah  Incluya los siguientes datos de los medicamentos: cantidad, frecuencia y motivo de administración  Traiga con usted la lista o los envases de la píldoras a basia citas de seguimiento  Lleve la lista de los medicamentos con usted en perry de valentino emergencia  Controle rangel estreñimiento:   · [place] líquidos jens se le haya indicado  Puede que necesite beber más líquidos para ayudar a ablandar las evacuaciones y evacuar el intestino  Pregunte cuánto líquido debe marquez cada día y cuáles líquidos son los más adecuados para usted  · Coma alimentos altos en fibras  [place] podría ayudar a disminuir el estreñimiento al aumentar el volumen de las evacuaciones intestinales  Alimentos altos en [address] frutas, vegetales, panes y cereales integrales, y frijoles  Rangel médico o dietista puede ayudarle a crear un plan alimenticio con alto contenido de [place]  · Realice actividad física con regularidad  La actividad física regular puede ayudarle a estimular basia intestinos  Pregunte cuáles son los ejercicios más adecuados para usted  · Programe valentino hora cada día para tener valentino evacuación intestinal   [place] podría ayudar a rangel cuerpo a acostumbrarse a tener evacuaciones intestinales regulares   Inclínese hacia adelante mientras está sentado en el inodoro para facilitar la expulsión de la evacuación intestinal  [place]éntese en el inodoro al menos por 10 minutos, incluso si usted no tiene valentino evacuación intestinal   Tesha Humera a basia consultas de control con upton médico según le indicaron  Anote basia preguntas para que se acuerde de hacerlas luz basia visitas  © 2017 [address] Information is for End User's use only and may not be sold, redistributed or otherwise used for commercial purposes  All illustrations and images included in CareNotes® are the copyrighted property of A D A M  Inc  or Martir Hull  Esta información es sólo para uso en educación  [place] intención no es darle un consejo médico sobre enfermedades o tratamientos  Colsulte con upton [place] farmacéutico antes de seguir cualquier régimen médico para saber si es seguro y efectivo para usted

## 2018-08-10 NOTE — TELEPHONE ENCOUNTER
(late entry)    S/P UMB hernia repair = 8/9/18    Called and left message for patient to return call to office  Received return call from patients daughter  She states that patient has no V/F/N/C  BUT she states that patient hasn't urinated or had a bowel movement since surgery  Checked with PA-C  She states that when patient was discharged from hospital he was told to report to ED if he did not void in 6 hours  Reiterated same to daughter  She states that he refused to go to ED  I told her it was imperative that patient report to the ED immediately  She stated that she would try to get him to go  I told her that he he did not have a choice - he needs to go to ED  Patient states that he would go if that was his only option  I told her that it was  I told her that I would check on patient in computer and keep in touch with him/her today or Monday - depending on discharge

## 2018-08-10 NOTE — ED PROVIDER NOTES
History  Chief Complaint   Patient presents with    Urinary Frequency     Patient had umbilical hernia repair yesterday here  Reports urinary frequency, burning, urgency  Patient voiding in the middle of the waiting room in a hand urinal  Denies flank pain  Denies fevers  Daughter states was told to come to ED if unable to "empty bladder and pee consistently"     72year old male presents today complaining of abdominal distention and difficulty urinating since yesterday  Pt underwent open umbilical hernia repair yesterday with Dr Yaron Dodge  Pt says that after the surgery, he was able to urinate but it was a small amount  Has been urinating small amounts since yesterday but feels very bloated  Also says that he has not had a bowel movement in 3 days  Admits to one episode of vomiting shortly after taking his pain medication  Has not vomited since then and has not been taking his pain medication at all today because he has not had any pain  Pt denies history of urinary retention  Denies any dysuria or hematuria  Prior to Admission Medications   Prescriptions Last Dose Informant Patient Reported? Taking?    HYDROcodone-acetaminophen (NORCO) 5-325 mg per tablet   No No   Sig: Take 1-2 tablets by mouth every 4 (four) hours as needed for pain for up to 25 doses Max Daily Amount: 12 tablets      Facility-Administered Medications: None       Past Medical History:   Diagnosis Date    Hypertension     occ elevated per son - on no meds    Low back pain     Right shoulder pain     Umbilical hernia without obstruction or gangrene     Uses Welsh as primary spoken language     son translated thru translation line    Wears glasses        Past Surgical History:   Procedure Laterality Date    NO PAST SURGERIES      GA REPAIR UMBILICAL PVPU,7+C/K,EAVPV N/A 8/9/2018    Procedure: REPAIR HERNIA UMBILICAL;  Surgeon: Melquiades Prieto MD;  Location: [facility];  Service: General       Family History   Problem Relation Age of Onset    No Known Problems Mother      I have reviewed and agree with the history as documented  Social History   Substance Use Topics    Smoking status: Never Smoker    Smokeless tobacco: Never Used    Alcohol use Yes        Review of Systems   Gastrointestinal: Positive for abdominal distention and constipation  Genitourinary: Positive for difficulty urinating  All other systems reviewed and are negative  Physical Exam  Physical Exam   Constitutional: He appears well-developed and well-nourished  No distress  HENT:   Head: Normocephalic and atraumatic  Eyes: Conjunctivae are normal    Cardiovascular: Normal rate, regular rhythm and normal heart sounds  Pulmonary/Chest: Effort normal and breath sounds normal  No respiratory distress  He has no wheezes  Abdominal: Soft  He exhibits distension (mild)  There is no tenderness  There is no guarding  Abdominal binder in place, steri strips noted to surgical site   Musculoskeletal: Normal range of motion  Neurological: He is alert  Skin: Skin is warm and dry  Capillary refill takes less than 2 seconds  He is not diaphoretic  Psychiatric: He has a normal mood and affect   His behavior is normal        Vital Signs  ED Triage Vitals   Temperature Pulse Respirations Blood Pressure SpO2   08/10/18 1304 08/10/18 1304 08/10/18 1304 08/10/18 1304 08/10/18 1304   (!) 97 °F (36 1 °C) 82 16 138/78 99 %      Temp Source Heart Rate Source Patient Position - Orthostatic VS BP Location FiO2 (%)   08/10/18 1304 08/10/18 1304 08/10/18 1304 08/10/18 1459 --   Oral Monitor Sitting Right arm       Pain Score       08/10/18 1304       5           Vitals:    08/10/18 1304 08/10/18 1459   BP: 138/78 (!) 180/92   Pulse: 82 60   Patient Position - Orthostatic VS: Sitting Standing       Visual Acuity      ED Medications  Medications   magnesium hydroxide (MILK OF MAGNESIA) 400 mg/5 mL oral suspension 30 mL (30 mL Oral Given 8/10/18 1412) Diagnostic Studies  Results Reviewed     Procedure Component Value Units Date/Time    Urine Microscopic [[id number]]  (Abnormal) Collected:  08/10/18 1331    Lab Status:  Final result Specimen:  Urine from Urine, Other Updated:  08/10/18 1422     RBC, UA 1-2 (A) /hpf      WBC, UA 10-20 (A) /hpf      Epithelial Cells Occasional /hpf      Bacteria, UA Moderate (A) /hpf     Urine culture [[id number]] Collected:  08/10/18 1331    Lab Status: In process Specimen:  Urine from Urine, Other Updated:  08/10/18 1422    POCT urinalysis dipstick [[id number]]  (Abnormal) Resulted:  08/10/18 1326    Lab Status:  Final result Updated:  08/10/18 1327    ED Urine Macroscopic [[id number]]  (Abnormal) Collected:  08/10/18 1331    Lab Status:  Final result Specimen:  Urine Updated:  08/10/18 1323     Color, UA Yellow     Clarity, UA Clear     pH, UA 6 0     Leukocytes, UA Moderate (A)     Nitrite, UA Negative     Protein, UA Negative mg/dl      Glucose, UA Negative mg/dl      Ketones, UA Negative mg/dl      Urobilinogen, UA 0 2 E U /dl      Bilirubin, UA Negative     Blood, UA Moderate (A)     Specific [place], UA 1 010    Narrative:       CLINITEK RESULT                 No orders to display              Procedures  Procedures       Phone Contacts  ED Phone Contact    ED Course                               MDM  Number of Diagnoses or Management Options  Constipation:   Umbilical hernia without obstruction and without gangrene:   Urinary retention:   Urinary tract infection:   Diagnosis management comments: Pt reports immediate improvement after cath  Case discussed with Dr Chacha Anthony, who recommends insertion of torres catheter and discharge on colace   Called the outpatient surgical office and they are comfortable seeing the patient in the office on Monday for removal     CritCare Time    Disposition  Final diagnoses:   Umbilical hernia without obstruction and without gangrene   Urinary retention   Constipation   Urinary tract infection     Time reflects when diagnosis was documented in both MDM as applicable and the Disposition within this note     Time User Action Codes Description Comment    8/10/2018  2:04 PM Ila Altman Add [R76 4] Umbilical hernia without obstruction and without gangrene     8/10/2018  2:28 PM Mauri RAZA Add [R33 9] Urinary retention     8/10/2018  2:28 PM Mauri RAZA Add [K59 00] Constipation     8/10/2018  3:32 PM Ila Altman Add [N39 0] Urinary tract infection       ED Disposition     ED Disposition Condition Comment    Discharge  Nathanreina Ramosler discharge to home/self care  Condition at discharge: Good        Follow-up Information     Follow up With Specialties Details Why Alfa Oakes MD General Surgery Schedule an appointment as soon as possible for a visit  [address] [place] [address] [address] [facility]  [phone]            Discharge Medication List as of 8/10/2018  2:07 PM      START taking these medications    Details   docusate sodium (COLACE) 100 mg capsule Take 1 capsule (100 mg total) by mouth 2 (two) times a day, Starting Fri 8/10/2018, Print         CONTINUE these medications which have NOT CHANGED    Details   HYDROcodone-acetaminophen (NORCO) 5-325 mg per tablet Take 1-2 tablets by mouth every 4 (four) hours as needed for pain for up to 25 doses Max Daily Amount: 12 tablets, Starting Thu 8/9/2018, Print           No discharge procedures on file      ED Provider  Electronically Signed by           Willi Watkins PA-C  08/10/18 5187

## 2018-08-11 LAB — BACTERIA UR CULT: NORMAL

## 2018-08-13 ENCOUNTER — OFFICE VISIT (OUTPATIENT)
Dept: SURGERY | Facility: CLINIC | Age: 73
End: 2018-08-13

## 2018-08-13 VITALS
DIASTOLIC BLOOD PRESSURE: 82 MMHG | WEIGHT: 136 LBS | RESPIRATION RATE: 16 BRPM | HEIGHT: 64 IN | TEMPERATURE: 98 F | BODY MASS INDEX: 23.22 KG/M2 | SYSTOLIC BLOOD PRESSURE: 148 MMHG | HEART RATE: 74 BPM

## 2018-08-13 DIAGNOSIS — K42.9 UMBILICAL HERNIA WITHOUT OBSTRUCTION AND WITHOUT GANGRENE: Primary | ICD-10-CM

## 2018-08-13 PROCEDURE — 99024 POSTOP FOLLOW-UP VISIT: CPT | Performed by: SURGERY

## 2018-08-13 NOTE — LETTER
August 13, 2018     Hermilo Toussaint, [address]  [address]    Patient: Dennie Sero   YOB: 1945   Date of Visit: 8/13/2018       Dear Dr Mike Toro:    Thank you for referring Dennie Sero to me for evaluation  Below are my notes for this consultation  If you have questions, please do not hesitate to call me  I look forward to following your patient along with you  Sincerely,        Tony Shoemaker MD        CC: No Recipients  Tony Shoemaker MD  8/13/2018 11:27 AM  Sign at close encounter  Assessment/Plan:   Dennie Sero is a 72 y o male who comes in today for postoperative check after  on   Postop umbilical hernia repair with urinary retention  Required a Alcaraz catheter in the ER, and now he is here for removal       He knows if this recurs or he cannot void within 4-6 hours that he needs to go back to the ER for another catheter  If it happens a 2nd time, we recommend Urology consult before removing the catheter  Postoperative restrictions reviewed, including specific lifting and exercise restrictions  All questions answered  ______________________________________________________  HPI:  Dennie Sero is a 72 y o male who comes in today for postoperative check after recent  on   Currently doing well with some problems :  Urinary retention, seen in the ER on Friday  Alcaraz placed  , no fever or chills,no nausea and no vomiting  Reports   ROS:  General ROS: negative for - chills, fatigue, fever or night sweats, weight loss  Respiratory ROS: no cough, shortness of breath, or wheezing  Cardiovascular ROS: no chest pain or dyspnea on exertion  Genito-Urinary ROS: no dysuria, trouble voiding, or hematuria  Musculoskeletal ROS: negative for - gait disturbance, joint pain or muscle pain  Neurological ROS: no TIA or stroke symptoms  GI ROS: see HPI  Skin ROS: no new rashes or lesions   Lymphatic ROS: no new adenopathy noted by pt     GYN ROS: see HPI, no new GYN history or bleeding noted  Psy ROS: no new mental or behavioral disturbances       Patient Active Problem List   Diagnosis    Low back pain    Right shoulder pain    Right thigh pain    Umbilical hernia without obstruction and without gangrene         Aspirin      Current Outpatient Prescriptions:     ciprofloxacin (CIPRO) 250 mg tablet, Take 1 tablet (250 mg total) by mouth every 12 (twelve) hours for 7 days, Disp: 14 tablet, Rfl: 0    docusate sodium (COLACE) 100 mg capsule, Take 1 capsule (100 mg total) by mouth 2 (two) times a day, Disp: 20 capsule, Rfl: 0    HYDROcodone-acetaminophen (NORCO) 5-325 mg per tablet, Take 1-2 tablets by mouth every 4 (four) hours as needed for pain for up to 25 doses Max Daily Amount: 12 tablets, Disp: 30 tablet, Rfl: 0    Past Medical History:   Diagnosis Date    Hypertension     occ elevated per son - on no meds    Low back pain     Right shoulder pain     Umbilical hernia without obstruction or gangrene     Uses Turkish as primary spoken language     son translated thru translation line    Wears glasses        Past Surgical History:   Procedure Laterality Date    NO PAST SURGERIES      LA REPAIR UMBILICAL NKIB,5+H/B,PIPXU N/A 8/9/2018    Procedure: REPAIR HERNIA UMBILICAL;  Surgeon: Syeda Moody MD;  Location: [facility];  Service: General       Family History   Problem Relation Age of Onset    No Known Problems Mother         reports that he has never smoked  He has never used smokeless tobacco  He reports that he drinks alcohol  He reports that he does not use drugs  PHYSICAL EXAM  General: normal, cooperative, no distress  Abdominal: soft, nondistended or nontender  Incision: clean, dry, and intact and healing well    Foleyremoved without difficulty  Some portions of this record may have been generated with voice recognition software  There may be translation, syntax,  or grammatical errors   Occasional wrong word or "sound-a-like" substitutions may have occurred due to the inherent limitations of the voice recognition software  Read the chart carefully and recognize, using context, where substitutions may have occurred  If you have any questions, please contact the dictating provider for clarification or correction, as needed  This encounter has been coded by a non-certified coder         Cedrick Bonilla MD    Date: 8/13/2018 Time: 11:25 AM

## 2018-08-13 NOTE — TELEPHONE ENCOUNTER
Patient presented to office this am to have torres removed  He was instructed that if he did not urinate by 6:00 pm today, he'd have to report to the ED  If patient needs second torres, needs to have urology remove      If patient doesn't return to ED, keep regularly scheduled POPV for next week in the office     (will keep open to check on patient tomorrow)

## 2018-08-13 NOTE — PROGRESS NOTES
Assessment/Plan:   Frankey Bolder is a 72 y o male who comes in today for postoperative check after  on   Postop umbilical hernia repair with urinary retention  Required a Alcaraz catheter in the ER, and now he is here for removal       He knows if this recurs or he cannot void within 4-6 hours that he needs to go back to the ER for another catheter  If it happens a 2nd time, we recommend Urology consult before removing the catheter  Will see him back in 1 week for further postoperative care  For now he looks great this early in the postoperative course  Postoperative restrictions reviewed, including specific lifting and exercise restrictions  All questions answered  ______________________________________________________  HPI:  Frankey Bolder is a 72 y o male who comes in today for postoperative check after recent  on   Currently doing well with some problems :  Urinary retention, seen in the ER on Friday  Alcaraz placed  , no fever or chills,no nausea and no vomiting  Reports   ROS:  General ROS: negative for - chills, fatigue, fever or night sweats, weight loss  Respiratory ROS: no cough, shortness of breath, or wheezing  Cardiovascular ROS: no chest pain or dyspnea on exertion  Genito-Urinary ROS: no dysuria, trouble voiding, or hematuria  Musculoskeletal ROS: negative for - gait disturbance, joint pain or muscle pain  Neurological ROS: no TIA or stroke symptoms  GI ROS: see HPI  Skin ROS: no new rashes or lesions   Lymphatic ROS: no new adenopathy noted by pt     GYN ROS: see HPI, no new GYN history or bleeding noted  Psy ROS: no new mental or behavioral disturbances       Patient Active Problem List   Diagnosis    Low back pain    Right shoulder pain    Right thigh pain    Umbilical hernia without obstruction and without gangrene         Aspirin      Current Outpatient Prescriptions:     ciprofloxacin (CIPRO) 250 mg tablet, Take 1 tablet (250 mg total) by mouth every 12 (twelve) hours for 7 days, Disp: 14 tablet, Rfl: 0    docusate sodium (COLACE) 100 mg capsule, Take 1 capsule (100 mg total) by mouth 2 (two) times a day, Disp: 20 capsule, Rfl: 0    HYDROcodone-acetaminophen (NORCO) 5-325 mg per tablet, Take 1-2 tablets by mouth every 4 (four) hours as needed for pain for up to 25 doses Max Daily Amount: 12 tablets, Disp: 30 tablet, Rfl: 0    Past Medical History:   Diagnosis Date    Hypertension     occ elevated per son - on no meds    Low back pain     Right shoulder pain     Umbilical hernia without obstruction or gangrene     Uses Slovenian as primary spoken language     son translated thru translation line    Wears glasses        Past Surgical History:   Procedure Laterality Date    NO PAST SURGERIES      DE REPAIR UMBILICAL XXKN,3+L/I,[facility] N/A 8/9/2018    Procedure: REPAIR HERNIA UMBILICAL;  Surgeon: Melquiades Prieto MD;  Location: [facility];  Service: General       Family History   Problem Relation Age of Onset    No Known Problems Mother         reports that he has never smoked  He has never used smokeless tobacco  He reports that he drinks alcohol  He reports that he does not use drugs  PHYSICAL EXAM  General: normal, cooperative, no distress  Abdominal: soft, nondistended or nontender  Incision: clean, dry, and intact and healing well    Foleyremoved without difficulty  Some portions of this record may have been generated with voice recognition software  There may be translation, syntax,  or grammatical errors  Occasional wrong word or "sound-a-like" substitutions may have occurred due to the inherent limitations of the voice recognition software  Read the chart carefully and recognize, using context, where substitutions may have occurred  If you have any questions, please contact the dictating provider for clarification or correction, as needed  This encounter has been coded by a non-certified coder         Melquiades Prieto MD    Date: 8/13/2018 Time: 11:25 AM

## 2018-08-14 ENCOUNTER — HOSPITAL ENCOUNTER (EMERGENCY)
Facility: HOSPITAL | Age: 73
Discharge: HOME | End: 2018-08-14
Attending: EMERGENCY MEDICINE
Payer: COMMERCIAL

## 2018-08-14 ENCOUNTER — TELEPHONE (OUTPATIENT)
Dept: UROLOGY | Facility: AMBULATORY SURGERY CENTER | Age: 73
End: 2018-08-14

## 2018-08-14 VITALS
HEART RATE: 82 BPM | OXYGEN SATURATION: 97 % | SYSTOLIC BLOOD PRESSURE: 155 MMHG | DIASTOLIC BLOOD PRESSURE: 66 MMHG | HEIGHT: 64 IN | RESPIRATION RATE: 19 BRPM | WEIGHT: 143.7 LBS | TEMPERATURE: 98 F | BODY MASS INDEX: 24.53 KG/M2

## 2018-08-14 DIAGNOSIS — R33.9 URINARY RETENTION: Primary | ICD-10-CM

## 2018-08-14 LAB
BACTERIA UR QL AUTO: ABNORMAL /HPF
BILIRUB UR QL STRIP: NEGATIVE
CLARITY UR: ABNORMAL
COLOR UR: YELLOW
GLUCOSE UR STRIP-MCNC: NEGATIVE MG/DL
HGB UR QL STRIP.AUTO: ABNORMAL
KETONES UR STRIP-MCNC: NEGATIVE MG/DL
LEUKOCYTE ESTERASE UR QL STRIP: NEGATIVE
NITRITE UR QL STRIP: NEGATIVE
NON-SQ EPI CELLS URNS QL MICRO: ABNORMAL /HPF
PH UR STRIP.AUTO: 6.5 [PH] (ref 4.5–8)
PROT UR STRIP-MCNC: ABNORMAL MG/DL
RBC #/AREA URNS AUTO: ABNORMAL /HPF
SP GR UR STRIP.AUTO: 1.02 (ref 1–1.03)
UROBILINOGEN UR QL STRIP.AUTO: 0.2 E.U./DL
WBC #/AREA URNS AUTO: ABNORMAL /HPF

## 2018-08-14 PROCEDURE — 99283 EMERGENCY DEPT VISIT LOW MDM: CPT

## 2018-08-14 PROCEDURE — 81001 URINALYSIS AUTO W/SCOPE: CPT

## 2018-08-14 RX ORDER — TAMSULOSIN HYDROCHLORIDE 0.4 MG/1
0.4 CAPSULE ORAL
Qty: 10 CAPSULE | Refills: 0 | Status: SHIPPED | OUTPATIENT
Start: 2018-08-14 | End: 2018-09-07

## 2018-08-14 NOTE — TELEPHONE ENCOUNTER
Patient is scheduled for follow up with Urology on 8/20/18      POPV scheduled with Dr Dolores Singh for 8/24/18 (mailed reminder card to patients home address )

## 2018-08-14 NOTE — ED PROVIDER NOTES
History  Chief Complaint   Patient presents with    Urinary Retention     pt had torres catheter placed on friday, was removed yesterday morning at 1100, has been voiding small amounts, having suprapubic pain and distention       History provided by:  Patient   used: No    Medical Problem   Location:  Bladder  Quality:  Pain  Severity:  Severe  Onset quality:  Gradual  Timing:  Constant  Progression:  Worsening  Chronicity:  Recurrent  Context:  History of urinary retention  Relieved by:  Nothing  Worsened by:  Nothing  Ineffective treatments:  Voiding trial   Associated symptoms: abdominal pain    Associated symptoms: no chest pain, no cough, no diarrhea, no fatigue, no fever, no headaches, no nausea, no rash, no shortness of breath, no sore throat and no vomiting        Prior to Admission Medications   Prescriptions Last Dose Informant Patient Reported? Taking?    HYDROcodone-acetaminophen (NORCO) 5-325 mg per tablet  Family Member No No   Sig: Take 1-2 tablets by mouth every 4 (four) hours as needed for pain for up to 25 doses Max Daily Amount: 12 tablets   ciprofloxacin (CIPRO) 250 mg tablet  Family Member No No   Sig: Take 1 tablet (250 mg total) by mouth every 12 (twelve) hours for 7 days   docusate sodium (COLACE) 100 mg capsule  Family Member No No   Sig: Take 1 capsule (100 mg total) by mouth 2 (two) times a day      Facility-Administered Medications: None       Past Medical History:   Diagnosis Date    Hypertension     occ elevated per son - on no meds    Low back pain     Right shoulder pain     Umbilical hernia without obstruction or gangrene     Uses Burundian as primary spoken language     son translated thru translation line    Wears glasses        Past Surgical History:   Procedure Laterality Date    NO PAST SURGERIES      ND REPAIR UMBILICAL ACQW,7+C/Y,RRTJW N/A 8/9/2018    Procedure: REPAIR HERNIA UMBILICAL;  Surgeon: Mohinder Callahan MD;  Location: [facility]; Service: General       Family History   Problem Relation Age of Onset    No Known Problems Mother      I have reviewed and agree with the history as documented  Social History   Substance Use Topics    Smoking status: Never Smoker    Smokeless tobacco: Never Used    Alcohol use Yes        Review of Systems   Constitutional: Negative for activity change, appetite change, diaphoresis, fatigue and fever  HENT: Negative for sore throat and trouble swallowing  Eyes: Negative for pain and visual disturbance  Respiratory: Negative for cough, chest tightness and shortness of breath  Cardiovascular: Negative for chest pain  Gastrointestinal: Positive for abdominal distention and abdominal pain  Negative for blood in stool, diarrhea, nausea and vomiting  Endocrine: Negative for polyphagia and polyuria  Genitourinary: Negative for dysuria, flank pain, frequency, hematuria and urgency  Musculoskeletal: Negative for back pain, gait problem, neck pain and neck stiffness  Skin: Negative for color change and rash  Allergic/Immunologic: Negative for immunocompromised state  Neurological: Negative for dizziness, speech difficulty, numbness and headaches  Hematological: Does not bruise/bleed easily  Psychiatric/Behavioral: Negative for confusion and decreased concentration  Physical Exam  Physical Exam   Constitutional: He is oriented to person, place, and time  He appears well-developed and well-nourished  HENT:   Head: Normocephalic  Eyes: Conjunctivae and EOM are normal  Pupils are equal, round, and reactive to light  No scleral icterus  Neck: Normal range of motion  Neck supple  Cardiovascular: Normal rate and regular rhythm  Abdominal: Soft  Bowel sounds are normal  There is no rebound and no guarding  Palpable, distended bladder  Musculoskeletal: Normal range of motion  He exhibits no tenderness or deformity  Lymphadenopathy:     He has no cervical adenopathy  Neurological: He is alert and oriented to person, place, and time  Coordination normal    Skin: Skin is warm and dry  He is not diaphoretic  Psychiatric: He has a normal mood and affect  Vitals reviewed        Vital Signs  ED Triage Vitals   Temperature Pulse Respirations Blood Pressure SpO2   08/14/18 0807 08/14/18 0807 08/14/18 0807 08/14/18 0807 08/14/18 0807   98 °F (36 7 °C) 92 22 (S) (!) 212/90 99 %      Temp Source Heart Rate Source Patient Position - Orthostatic VS BP Location FiO2 (%)   08/14/18 0807 08/14/18 0807 08/14/18 0923 08/14/18 0807 --   Temporal Monitor Lying Right arm       Pain Score       08/14/18 0807       Worst Possible Pain           Vitals:    08/14/18 0807 08/14/18 0923   BP: (S) (!) 212/90 155/66   Pulse: 92 82   Patient Position - Orthostatic VS:  Lying       Visual Acuity      ED Medications  Medications - No data to display    Diagnostic Studies  Results Reviewed     Procedure Component Value Units Date/Time    Urine Microscopic [[id number]]  (Abnormal) Collected:  08/14/18 0916    Lab Status:  Final result Specimen:  Urine from Urine, Indwelling Alcaraz Catheter Updated:  08/14/18 0958     RBC, UA Innumerable (A) /hpf      WBC, UA 0-1 (A) /hpf      Epithelial Cells Occasional /hpf      Bacteria, UA None Seen /hpf     POCT urinalysis dipstick [[id number]]  (Abnormal) Resulted:  08/14/18 0910    Lab Status:  Final result Specimen:  Urine Updated:  08/14/18 0910    ED Urine Macroscopic [[id number]]  (Abnormal) Collected:  08/14/18 0916    Lab Status:  Final result Specimen:  Urine Updated:  08/14/18 0909     Color, UA Yellow     Clarity, UA Cloudy     pH, UA 6 5     Leukocytes, UA Negative     Nitrite, UA Negative     Protein,  (2+) (A) mg/dl      Glucose, UA Negative mg/dl      Ketones, UA Negative mg/dl      Urobilinogen, UA 0 2 E U /dl      Bilirubin, UA Negative     Blood, UA Large (A)     Specific Pace, UA 1 020    Narrative:       CLINITEK RESULT                 No orders to display              Procedures  Procedures       Phone Contacts  ED Phone Contact    ED Course                               MDM  Number of Diagnoses or Management Options  Urinary retention: new and requires workup  Diagnosis management comments: 56-year-old male who is several days status post surgical repair of an umbilical hernia presents for urinary retention  Patient had urinary retention after surgery and had a Alcaraz catheter on discharge from the hospital   He was seen at his general surgeon's office yesterday and the Alcaraz catheter was removed for a voiding trial   Patient has only been able to void small amounts since then and now has a sensation of lower abdominal/pelvic distention and pain  Denies any fevers, chest pain, shortness of breath  Abdominal pain is isolated to the suprapubic area  No fevers, nausea, vomiting  Patient was noted to have a palpable distended bladder on exam and was noted to have a distended bladder on ultrasound  No free fluid was noted in the abdomen  Alcaraz catheter was placed in the ED which yielded about 850 cc of clear urine  Discussed patient's case with Urology on call  Recommend follow-up in about one week for for Alcaraz removal   Will also start Flomax once daily  This was discussed with the patient and his family  Discussed return precautions for any new or worsening symptoms  Patient also follow up with his general surgery in as already scheduled         Amount and/or Complexity of Data Reviewed  Clinical lab tests: reviewed and ordered  Review and summarize past medical records: yes      CritCare Time    Disposition  Final diagnoses:   Urinary retention     Time reflects when diagnosis was documented in both MDM as applicable and the Disposition within this note     Time User Action Codes Description Comment    8/14/2018  9:22 AM Valentín Alex Add [R33 9] Urinary retention       ED Disposition     ED Disposition Condition Comment    Discharge  [facility]  discharge to home/self care  Condition at discharge: Good        Follow-up Information     Follow up With Specialties Details Why [address] [facility] [place] [facility]   [facility] [place] [id number] [phone]          Discharge Medication List as of 8/14/2018  9:26 AM      START taking these medications    Details   tamsulosin (FLOMAX) 0 4 mg Take 1 capsule (0 4 mg total) by mouth daily with dinner for 10 days, Starting Tue 8/14/2018, Until Fri 8/24/2018, Print         CONTINUE these medications which have NOT CHANGED    Details   ciprofloxacin (CIPRO) 250 mg tablet Take 1 tablet (250 mg total) by mouth every 12 (twelve) hours for 7 days, Starting Fri 8/10/2018, Until Fri 8/17/2018, Print      docusate sodium (COLACE) 100 mg capsule Take 1 capsule (100 mg total) by mouth 2 (two) times a day, Starting Fri 8/10/2018, Print      HYDROcodone-acetaminophen (NORCO) 5-325 mg per tablet Take 1-2 tablets by mouth every 4 (four) hours as needed for pain for up to 25 doses Max Daily Amount: 12 tablets, Starting Thu 8/9/2018, Print           No discharge procedures on file      ED Provider  Electronically Signed by           Violeta Roldan MD  08/14/18 1123

## 2018-08-14 NOTE — TELEPHONE ENCOUNTER
Reason for appointment/Complaint/Diagnosis : URINARY RETENTION HAS WILLIAMSON     Insurance: ACCESS CARD ( YELLOW CARD)     History of Cancer? no                       If yes, what kind? Previous urologist?      NO               Records requested/where? In [place]     Outside testing/where? In [facility]     Location Preference for office visit? [place]     I spoke with patient's wife, she wants to know if patient can take Flomax until his appointment?  Please advise and call her back [phone]

## 2018-08-14 NOTE — ED NOTES
Alcaraz clamped as pt had return of approx 1,000mL of urine  Will unclamped and allow to drain in approx 20 minutes       Nasra Sahu RN  08/14/18 9457

## 2018-08-14 NOTE — DISCHARGE INSTRUCTIONS
Retención urinaria en hombres   LO QUE NECESITA SABER:   La retención urinaria es valentino condición que se presenta cuando la vejiga no se vacía completamente al Emaline Bumps  INSTRUCCIONES SOBRE [facility]:   Medicamentos:   · Medicamentos,  puede ayudar a disminuir el tamaño de la próstata, combatir las infecciones y lo ayudan a orinar con más facilidad  · [place] basia medicamentos jens se le haya indicado  Consulte con rangel médico si usted malgorzata que rangel medicamento no le está ayudando o si presenta efectos secundarios  Infórmele si es alérgico a cualquier medicamento  Mantenga valentino lista actualizada de los [place], las vitaminas y los productos herbales que kaylah  Incluya los siguientes datos de los medicamentos: cantidad, frecuencia y motivo de administración  Traiga con usted la lista o los envases de la píldoras a basia citas de seguimiento  Lleve la lista de los medicamentos con usted en perry de valentino emergencia  Cuidado de la sonda de Alcaraz:  Es posible que necesite un catéter de Alcaraz luz un christelle de 2 semanas en casa  [employer] le darán valentino bolsa para la pierna más pequeña para recolectar la orina  Mantenga la bolsa debajo del nivel de rangel cintura  [place] va a prevenir que la orina fluya de regreso a rangel vejiga y cause valentino infección u otros problemas  También mantenga el tubo sin torceduras para que la orina drene apropiadamente  No jale del catéter  [place] puede provocarle dolor y sangrado y podría provocar que el catéter se salga  Consulte con rangel médico o urólogo para obtener [facility] cuidado de la sonda de Alcaraz  Orine con frecuencia:  Cuando le retiren el catéter, no espere a tener la vejiga demasiado llena para orinar  Fije horarios regulares cada día para orinar  Orine shafer pronto sienta la necesidad de [place], o al menos cada 3 horas cuando esté despierto  No connor líquidos antes de irse a dormir  Orine cisco antes de irse a la cama    Programe valentino aminta con rangel médico o urólogo jens se le indique:  Anote basia preguntas para que se acuerde de hacerlas luz basia visitas  Comuníquese con rangel médico o urólogo si:   · Usted tiene fiebre  · Siente dolor al Norlin Mutton  · Usted orina con destinee  · Usted tiene problemas con el catéter  · Usted tiene preguntas o inquietudes acerca de rangel condición o cuidado  Regrese a la doug de emergencias si:   · Usted tiene dolor abdominal intenso  · Usted está respirando más rápido que lo normal     · Rangel ritmo cardíaco es más rápido de lo normal     · Rangel patricia, vahe, pies o tobillos están inflamados  © 2017 [address] [employer] Information is for End User's use only and may not be sold, redistributed or otherwise used for commercial purposes  All illustrations and images included in CareNotes® are the copyrighted property of A D A M , Inc  or Martir Hull  Esta información es sólo para uso en educación  Rangel intención no es darle un consejo médico sobre enfermedades o tratamientos  Colsulte con rangel Jenelle Maxim farmacéutico antes de seguir cualquier régimen médico para saber si es seguro y efectivo para usted

## 2018-08-20 ENCOUNTER — OFFICE VISIT (OUTPATIENT)
Dept: UROLOGY | Facility: MEDICAL CENTER | Age: 73
End: 2018-08-20
Payer: COMMERCIAL

## 2018-08-20 VITALS
WEIGHT: 139 LBS | DIASTOLIC BLOOD PRESSURE: 84 MMHG | SYSTOLIC BLOOD PRESSURE: 156 MMHG | HEIGHT: 62 IN | BODY MASS INDEX: 25.58 KG/M2

## 2018-08-20 DIAGNOSIS — R33.8 ACUTE URINARY RETENTION: Primary | ICD-10-CM

## 2018-08-20 DIAGNOSIS — Z71.89 OTHER SPECIFIED COUNSELING: ICD-10-CM

## 2018-08-20 PROCEDURE — 99203 OFFICE O/P NEW LOW 30 MIN: CPT | Performed by: UROLOGY

## 2018-08-20 NOTE — LETTER
August 20, 2018     Darlene Arteaga MD  [address]  [address]    Patient: Radha Munoz   YOB: 1945   Date of Visit: 8/20/2018       Dear Dr Rey Olsone: Thank you for referring Radha Munoz to me for evaluation  Below are my notes for this consultation  If you have questions, please do not hesitate to call me  I look forward to following your patient along with you  Sincerely,        Stef Dangelo MD        CC: Rehab Madi Alfonso, DO Stef Dangelo MD  8/20/2018  2:02 PM  Sign at close encounter  Assessment/Plan:    Acute urinary retention    Voiding trial tomorrow  Diagnoses and all orders for this visit:    Acute urinary retention    Other specified counseling          Subjective:      Patient ID: Radha Munoz is a 72 y o  male  HPI  Urinary retention:  The patient developed urinary retention immediately following umbilical hernia repair  His catheter was removed for two voiding trials about a week ago and had to be replaced  Cathed for 850 cc  Tamsulosin started by ER  The patient presents now for evaluation  Pre-op, no nocturia, dysuria or blood  Occas urgency  Dtr translates  The following portions of the patient's history were reviewed and updated as appropriate: allergies, current medications, past family history, past medical history, past social history, past surgical history and problem list     Review of Systems   Constitutional: Negative for activity change and fatigue  Respiratory: Negative for shortness of breath and wheezing  Cardiovascular: Negative for chest pain  Gastrointestinal: Negative for abdominal pain  Genitourinary: Negative for difficulty urinating, dysuria, frequency, hematuria and urgency  Musculoskeletal: Negative for back pain and gait problem  Skin: Negative  Allergic/Immunologic: Negative  Neurological: Negative  Psychiatric/Behavioral: Negative  Objective:      /84 (BP Location: Left arm, Patient Position: Sitting, Cuff Size: Standard)   Ht 5' 2" (1 575 m)   Wt 63 kg (139 lb)   BMI 25 42 kg/m²           Physical Exam   Constitutional: He is oriented to person, place, and time  He appears well-developed and well-nourished  HENT:   Head: Normocephalic and atraumatic  Eyes: EOM are normal    Neck: Normal range of motion  Neck supple  Cardiovascular: Normal rate, regular rhythm and normal heart sounds  Pulmonary/Chest: Effort normal and breath sounds normal    Abdominal: Soft  There is no tenderness  There is mild ecchymosis surrounding the umbilicus  The surgical incision is healing nicely  There is no indication of infection  Genitourinary: Rectum normal    Genitourinary Comments: The prostate is 50 g, smooth, non-tender  Musculoskeletal: Normal range of motion  Neurological: He is alert and oriented to person, place, and time  Skin: Skin is warm and dry  Psychiatric: He has a normal mood and affect   His behavior is normal  Judgment and thought content normal

## 2018-08-21 ENCOUNTER — CLINICAL SUPPORT (OUTPATIENT)
Dept: UROLOGY | Facility: MEDICAL CENTER | Age: 73
End: 2018-08-21
Payer: COMMERCIAL

## 2018-08-21 ENCOUNTER — TELEPHONE (OUTPATIENT)
Dept: NEUROSURGERY | Facility: CLINIC | Age: 73
End: 2018-08-21

## 2018-08-21 VITALS
WEIGHT: 139 LBS | SYSTOLIC BLOOD PRESSURE: 152 MMHG | DIASTOLIC BLOOD PRESSURE: 82 MMHG | HEIGHT: 62 IN | BODY MASS INDEX: 25.58 KG/M2

## 2018-08-21 DIAGNOSIS — R33.9 URINARY RETENTION: Primary | ICD-10-CM

## 2018-08-21 PROCEDURE — 99211 OFF/OP EST MAY X REQ PHY/QHP: CPT

## 2018-08-21 NOTE — PROGRESS NOTES
Patient returns for Alcaraz removal  Alcaraz removed without difficulty, patient tolerated procedure well  Urine draining clear  Denies fever or urinary symptoms Pt does have a headache which may by from the hydrocodone   Patient instructed to increase fluids and limit caffeine intake  To return to office if unable to void within 4-6 hours, or has increased discomfort

## 2018-08-23 ENCOUNTER — CLINICAL SUPPORT (OUTPATIENT)
Dept: UROLOGY | Facility: MEDICAL CENTER | Age: 73
End: 2018-08-23
Payer: COMMERCIAL

## 2018-08-23 VITALS — BODY MASS INDEX: 25.58 KG/M2 | WEIGHT: 139 LBS | HEIGHT: 62 IN

## 2018-08-23 DIAGNOSIS — R33.8 ACUTE URINARY RETENTION: Primary | ICD-10-CM

## 2018-08-23 LAB — POST-VOID RESIDUAL VOLUME, ML POC: 58 ML

## 2018-08-23 PROCEDURE — 51798 US URINE CAPACITY MEASURE: CPT

## 2018-08-23 NOTE — PROGRESS NOTES
Pt presents for a PVR post voiding trial 08/21/18  PVR showed 58ml which is good  Pt had no complaints

## 2018-08-24 ENCOUNTER — HOSPITAL ENCOUNTER (OUTPATIENT)
Dept: RADIOLOGY | Facility: HOSPITAL | Age: 73
Discharge: HOME | End: 2018-08-24
Payer: COMMERCIAL

## 2018-08-24 ENCOUNTER — OFFICE VISIT (OUTPATIENT)
Dept: NEUROSURGERY | Facility: CLINIC | Age: 73
End: 2018-08-24
Payer: COMMERCIAL

## 2018-08-24 ENCOUNTER — TRANSCRIBE ORDERS (OUTPATIENT)
Dept: RADIOLOGY | Facility: HOSPITAL | Age: 73
End: 2018-08-24

## 2018-08-24 ENCOUNTER — OFFICE VISIT (OUTPATIENT)
Dept: SURGERY | Facility: CLINIC | Age: 73
End: 2018-08-24

## 2018-08-24 VITALS
HEIGHT: 62 IN | WEIGHT: 142.2 LBS | BODY MASS INDEX: 26.17 KG/M2 | DIASTOLIC BLOOD PRESSURE: 80 MMHG | HEART RATE: 62 BPM | TEMPERATURE: 97.9 F | SYSTOLIC BLOOD PRESSURE: 130 MMHG

## 2018-08-24 VITALS
HEIGHT: 62 IN | WEIGHT: 138 LBS | BODY MASS INDEX: 25.4 KG/M2 | RESPIRATION RATE: 12 BRPM | DIASTOLIC BLOOD PRESSURE: 82 MMHG | HEART RATE: 73 BPM | SYSTOLIC BLOOD PRESSURE: 140 MMHG | TEMPERATURE: 98.2 F

## 2018-08-24 DIAGNOSIS — G89.29 CHRONIC RIGHT SHOULDER PAIN: ICD-10-CM

## 2018-08-24 DIAGNOSIS — M25.511 CHRONIC RIGHT SHOULDER PAIN: ICD-10-CM

## 2018-08-24 DIAGNOSIS — M54.50 CHRONIC LOW BACK PAIN WITHOUT SCIATICA, UNSPECIFIED BACK PAIN LATERALITY: Primary | ICD-10-CM

## 2018-08-24 DIAGNOSIS — M47.816 LUMBAR SPONDYLOSIS: ICD-10-CM

## 2018-08-24 DIAGNOSIS — K43.9 VENTRAL HERNIA WITHOUT OBSTRUCTION OR GANGRENE: Primary | ICD-10-CM

## 2018-08-24 DIAGNOSIS — M79.651 RIGHT THIGH PAIN: ICD-10-CM

## 2018-08-24 DIAGNOSIS — G89.29 CHRONIC LEFT-SIDED LOW BACK PAIN WITHOUT SCIATICA: ICD-10-CM

## 2018-08-24 DIAGNOSIS — S22.080S CLOSED WEDGE COMPRESSION FRACTURE OF TWELFTH THORACIC VERTEBRA, SEQUELA: ICD-10-CM

## 2018-08-24 DIAGNOSIS — G89.29 CHRONIC LOW BACK PAIN WITHOUT SCIATICA, UNSPECIFIED BACK PAIN LATERALITY: Primary | ICD-10-CM

## 2018-08-24 DIAGNOSIS — M54.50 CHRONIC LEFT-SIDED LOW BACK PAIN WITHOUT SCIATICA: ICD-10-CM

## 2018-08-24 DIAGNOSIS — M47.812 OSTEOARTHRITIS OF CERVICAL SPINE, UNSPECIFIED SPINAL OSTEOARTHRITIS COMPLICATION STATUS: ICD-10-CM

## 2018-08-24 PROBLEM — S22.080A CLOSED WEDGE COMPRESSION FRACTURE OF T12 VERTEBRA (HCC): Status: ACTIVE | Noted: 2018-08-24

## 2018-08-24 PROCEDURE — 99024 POSTOP FOLLOW-UP VISIT: CPT | Performed by: SURGERY

## 2018-08-24 PROCEDURE — 72110 X-RAY EXAM L-2 SPINE 4/>VWS: CPT

## 2018-08-24 PROCEDURE — 99213 OFFICE O/P EST LOW 20 MIN: CPT | Performed by: PHYSICIAN ASSISTANT

## 2018-08-24 NOTE — LETTER
August 26, 2018     Donato Garcia, [address]  [address] [employer]    Patient: Genevieve Verdin   YOB: 1945   Date of Visit: 8/24/2018       Dear Dr Leanne Hammond:    Thank you for referring Genevieve Verdin to me for evaluation  Below are my notes for this consultation  If you have questions, please do not hesitate to call me  I look forward to following your patient along with you  Sincerely,        Rosie Lubin MD        CC: No Recipients  Sebastián Aggarwal PA-C  8/26/2018 10:57 PM  Sign at close encounter  Assessment/Plan:     Diagnoses and all orders for this visit:    Chronic low back pain without sciatica, unspecified back pain laterality  -     Ambulatory referral to Pain Management; Future    Closed wedge compression fracture of twelfth thoracic vertebra, sequela  Comments:  chronic compression deformity T12  Orders:  -     Ambulatory referral to Pain Management; Future    Lumbar spondylosis  -     Ambulatory referral to Pain Management; Future    Osteoarthritis of cervical spine, unspecified spinal osteoarthritis complication status    Chronic right shoulder pain  -     Ambulatory referral to Orthopedic Surgery; Future            Discussion / Summary: This is a 72 y o  male who presents for follow up s/p completion of MRI C/T-spine MRIs and L-spine Xray  Mr Dany gilmore c/o is low back pain  C-spine MRI ( 6/7/18 ), T-spine MRI 6/7/18) and L-spine xray (8/24/18)  was reviewed in detail by Dr Peter Ding  There is normal signal within visualized cord  The dens is in close proximity to clivus but no significant stenosis of canal  Dr Peter Ding does not recommend neurosurgical intervention in regards to c-spine  There is no instability on lumbar flex/ext imaging  There is presence of chronic compression deformity of T12 vertebral body    Surgical intervention (ie  T10-L2 instrumented fixation and T12 corpectomy) is not a guarantee of alleviating Mr Dany hartmann pain and Dr Yadira Foote does not recommend neurosurgical intervention at this juncture  Mr Padmini Rodriguez is advised to pursue further management with Pain Management  Patient reports he wishes to pursue Pain Management care with a new pain management provider so referral to [facility] has been provided  He is also encouraged to pursue Orthopedic evaluation (as also referred at last visit) in setting of right shoulder pain / history of partial thickness tear of supraspinatus tendon and arthritic changes involving the acromioclavicular joint  Patient is to contact our office or present to hospital Emergency Room if experience worsening or new neck/back pain, numbness/tinging of upper extremities, difficulty with coordination hands/feeding self,  sensory or motor change, gait change, bladder or bowel dysfunction, or other neurological change  Patient and son expressed understanding and agreement     _________________________________________________________________________________    Subjective:      Patient ID: Lucymayela Jacobson is a 72 y o  male with c/o of low back pain  He presents s/p L-spine flex/ext xray  He was noted to have some brisk reflexes previously on examination and also underwent MRI C/T-spine  He has not pursued Orthopedic evaluation of right shoulder  Hx of right shoulder pain and right shoulder MRI with partial thickness tear of supraspinatus tendon and arthritic changes involving the acromioclavicular joint  Mr Padmini Rodriguez is accompanied with his son  Patient is a limited historian  The Language line  (# [id number] [facility]) was utilized for [address] translation  HPI  In review -- patient began in June 2017 with right shoulder pain and low back pain after lifting a trash bag at work  He had right shoulder MRI and L-spine MRI completed in Oct  2017  He completed approximately 6 months of physical therapy with little relief    He previously reported a right shoulder injection did not alleviate his shoulder pain  He has not seen an Orthopedic provider  He had history of low back injection (L3, L4, L5, S1) in Oct  2017 with some relief for a few days  Previously saw Dr Shaw Hoyt of Pain Management but reports has not followed up with him recently  Patient reports left sided low back pain which waxes/wanes in degree  He currently rates LBP as 4-5/10 on pain scale  Laying aggravates his back pain  Sitting sometimes helps his back pain but then can aggravate his back pain after sitting durations  Changing positions provides some relief  He denies lower extremity pain  He does report an intermittent sensation of numbness over the anterior left thigh that he can notice when he rubs his left thigh  He denies weakness of his lower extremities  Patient reports he may experience a little bit of neck pain with slouching posture  Currently rates neck pain as 3/10 on pain scale  He reports placing cold water on neck, massaging neck, or lying down are alleviating factors  He continues with right shoulder pain with limited range of motion of his right shoulder  He denies any difficulty with coordination/feeding self  He denies numbness or tingling of his upper extremities or torso  Patient reports he is able to walk steps without handrail  He denies any problem with walking on flat surface  He reports he is able to control his bladder but does sometimes feel he has incomplete emptying  He denies bowel dysfunction  He denies saddle paresthesias  The following portions of the patient's history were reviewed and updated as appropriate: allergies, current medications, past family history, past medical history, past social history and past surgical history  Review of Systems   Constitutional: Negative for fever  Respiratory: Negative for shortness of breath      Gastrointestinal:        See HPI   Genitourinary:        See HPI   Musculoskeletal: Positive for back pain and neck pain  Neurological:        See HPI   Psychiatric/Behavioral: Negative for agitation  Objective:      /80 (BP Location: Right arm, Patient Position: Sitting, Cuff Size: Standard)   Pulse 62   Temp 97 9 °F (36 6 °C) (Tympanic)   Ht 5' 2" (1 575 m)   Wt 64 5 kg (142 lb 3 2 oz)   BMI 26 01 kg/m²           Physical Exam   Constitutional: He appears well-developed and well-nourished  No distress  Pulmonary/Chest: Effort normal    Musculoskeletal:        Cervical back: He exhibits no tenderness and no deformity  Thoracic back: He exhibits no tenderness and no deformity  Lumbar back: He exhibits no tenderness and no deformity  Neurological: He is alert  Gait normal    Reflex Scores:       Tricep reflexes are 1+ on the right side and 2+ on the left side  Bicep reflexes are 2+ on the right side and Left biceps reflex grade: +2-3  Cathy Lightning Brachioradialis reflexes are 2+ on the right side and 2+ on the left side  Patellar reflexes are 3+ on the right side and 3+ on the left side  Achilles reflexes are 2+ on the right side and 2+ on the left side  Psychiatric: He has a normal mood and affect  Neurologic Exam     Mental Status   Speech: ([place] speaking )  Level of consciousness: alert    Motor Exam   Motor:  Shoulder abduction left 5/5 with FROM  Shoulder abduction right 4+/5 with limited ROM to about 110 degrees  Elbow flexion/extension 5/5 bilaterally  Wrist flexion/extension 5/5 bilaterally  Finger  / abduction 5/5 bilaterally  Hip flexion/abduction/adduction 5/5 bilaterally  Knee flexion/extension 5/5 bilaterally  Ankle DF/PF 5/5 bilaterally  Great toe DF 5/5 bilaterally  Sensory Exam   Vibration normal    Proprioception normal    Pinprick normal      Sensation to pinprick intact b/l upper extremities, torso, and b/l lower extremities  JPS and Vibratory sense intact b/l thumbs and great toes         Gait, Coordination, and Reflexes     Gait  Gait: normal (Independent )    Reflexes   Right brachioradialis: 2+  Left brachioradialis: 2+  Right biceps: 2+  Left biceps reflex grade: +2-3  Right triceps: 1+  Left triceps: 2+  Right patellar: 3+  Left patellar: 3+  Right achilles: 2+  Left achilles: 2+  Right plantar: normal  Left plantar: normal  Right Muñoz: absent  Left Muñoz: absent  Right ankle clonus: absent  Left ankle clonus present: 1-2  Imaging study:    8/24/18 [facility]) L-spine xray -- per report: " LUMBAR SPINE     INDICATION:   M54 5: Low back pain  G89 29: Other chronic pain  M79 651: Pain in right thigh  M47 816: Spondylosis without myelopathy or radiculopathy, lumbar region      COMPARISON:  June 6, 2017     VIEWS:  XR SPINE LUMBAR MINIMUM 4 VIEWS NON INJURY  Images: 4     FINDINGS: Normal lordosis of lumbar spine  There is a wedge compression deformity of T12 which is stable from June 2017 and causes abrupt kyphosis at this level      Complex S-shaped scoliotic curvature the lumbar component is concave right  No spondylolisthesis  No instability with flexion or extension      There is no evidence of acute fracture or destructive osseous lesion      Mild degenerative disc and degenerative facet joint changes commensurate with patient's age      Pedicles appear symmetric      Mild degenerative changes both sacroiliac joints      Visualized lungs are clear  Visualized bowel gas pattern is unremarkable    Visualized cardiac silhouette is normal            IMPRESSION:  No change from June 2017  Old anterior wedge compression fracture of T12 is stable      Workstation performed: [id number] "      __________________________________________________________________________    6/7/18 [facility]) T-spine MRI -- per report: " MRI THORACIC SPINE WITHOUT CONTRAST     INDICATION: R29 2: Abnormal reflex     COMPARISON:  None      TECHNIQUE:  Sagittal T1, sagittal T2, sagittal inversion recovery, axial T2,  axial 2D MERGE      IMAGE QUALITY: Diagnostic      FINDINGS:     ALIGNMENT: Straightening of normal thoracic kyphosis, right convex mid thoracic scoliosis, chronic wedge deformity L1 with 4050% loss of height anteriorly  No bony retropulsion or canal stenosis      MARROW SIGNAL:  Normal marrow signal is identified within the visualized bony structures  No discrete marrow lesion      THORACIC CORD: Normal signal within the thoracic cord  Cord terminates at the upper L2 level      PARAVERTEBRAL SOFT TISSUES:  Normal      THORACIC DEGENERATIVE CHANGE: Mild multilevel degenerative changes which do not result in cord or nerve root compression at any level      IMPRESSION:     No compressive cord or root disease  Right convex mid dorsal scoliosis, straightening of normal thoracic kyphosis, sclerotic anterior wedge deformity L1            Workstation performed: [id number] "    __________________________________________________________________________      6/7/18 [facility] C-spine MRI -- per report: " MRI CERVICAL SPINE WITHOUT CONTRAST     INDICATION: R29 2: Abnormal reflex limited range of motion right shoulder,     COMPARISON:  None      TECHNIQUE:  Sagittal T1, sagittal T2, sagittal inversion recovery, axial T2, axial  2D merge     IMAGE QUALITY:  Diagnostic     FINDINGS:     ALIGNMENT:  Straightening of cervical lordosis with the minor retrolisthesis of C2-C3 on C4  Fusion of the C2 and C3 vertebral bodies and the posterior elements are noted  The odontoid appears elongated and appears retroverted  Tip of the odontoid is   7 mm above [place]'s line consistent with basilar patchy radiation  No significant stenosis of the canal which exceeds 2 cm in AP dimension        MARROW SIGNAL:  Normal marrow signal is identified within the visualized bony structures    No discrete marrow lesion      CERVICAL AND VISUALIZED THORACIC CORD:  Normal signal within the visualized cord      PREVERTEBRAL AND PARASPINAL SOFT TISSUES: Normal      VISUALIZED POSTERIOR FOSSA:  The visualized posterior fossa demonstrates no abnormal signal      CERVICAL DISC SPACES:     C2-C3:  C2 and C3 vertebral bodies are fused  The foramen are patent      C3-C4:  Asymmetric right facet and uncinate disease with severe right and moderately severe left foraminal stenosis      C4-C5:  Central broad-based protrusion, AP dimension of the sac approaches 8-9 mm      C5-C6:  Marginal osteophytes and circumferential bulge  AP dimension of the sac reduced to 7-8 mm      C6-C7:  Mild bilateral facet and uncinate arthrosis  Marginal osteophytes  No significant stenosis      C7-T1:  Bilateral facet arthrosis, marginal osteophytes      UPPER THORACIC DISC SPACES:  Normal      IMPRESSION:     Congenital fusion C2-C3, 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

## 2018-08-24 NOTE — PATIENT INSTRUCTIONS
Contact our office or present to hospital Emergency Room if experience worsening or new neck/back pain, numbness/tinging of upper extremities, difficulty with coordination hands/feeding self,  sensory or motor change, gait change, bladder or bowel dysfunction, or other neurological change

## 2018-08-24 NOTE — LETTER
August 26, 2018     Justine Schmitt, [address]  [address]    Patient: Ivan Wyatt   YOB: 1945   Date of Visit: 8/24/2018       Dear Dr Jessica Beltran:    Thank you for referring Ivan Wyatt to me for evaluation  Below are my notes for this consultation  If you have questions, please do not hesitate to call me  I look forward to following your patient along with you  Sincerely,        Laine White MD        CC: No Recipients  Jeani Barthel, PA-C  8/26/2018 10:57 PM  Sign at close encounter  Assessment/Plan:     Diagnoses and all orders for this visit:    Chronic low back pain without sciatica, unspecified back pain laterality  -     Ambulatory referral to Pain Management; Future    Closed wedge compression fracture of twelfth thoracic vertebra, sequela  Comments:  chronic compression deformity T12  Orders:  -     Ambulatory referral to Pain Management; Future    Lumbar spondylosis  -     Ambulatory referral to Pain Management; Future    Osteoarthritis of cervical spine, unspecified spinal osteoarthritis complication status    Chronic right shoulder pain  -     Ambulatory referral to Orthopedic Surgery; Future            Discussion / Summary: This is a 72 y o  male who presents for follow up s/p completion of MRI C/T-spine MRIs and L-spine Xray  Mr Valerie gilmore c/o is low back pain  C-spine MRI ( 6/7/18 ), T-spine MRI 6/7/18) and L-spine xray (8/24/18)  was reviewed in detail by Dr Ghazal Taylor  There is normal signal within visualized cord  The dens is in close proximity to clivus but no significant stenosis of canal  Dr Ghzaal Taylor does not recommend neurosurgical intervention in regards to c-spine  There is no instability on lumbar flex/ext imaging  There is presence of chronic compression deformity of T12 vertebral body    Surgical intervention (ie  T10-L2 instrumented fixation and T12 corpectomy) is not a guarantee of alleviating Mr Valerie hartmann pain and Dr Ghazal Taylor does not recommend neurosurgical intervention at this juncture  Mr Campos Baer is advised to pursue further management with Pain Management  Patient reports he wishes to pursue Pain Management care with a new pain management provider so referral to [facility] has been provided  He is also encouraged to pursue Orthopedic evaluation (as also referred at last visit) in setting of right shoulder pain / history of partial thickness tear of supraspinatus tendon and arthritic changes involving the acromioclavicular joint  Patient is to contact our office or present to hospital Emergency Room if experience worsening or new neck/back pain, numbness/tinging of upper extremities, difficulty with coordination hands/feeding self,  sensory or motor change, gait change, bladder or bowel dysfunction, or other neurological change  Patient and son expressed understanding and agreement     _________________________________________________________________________________    Subjective:      Patient ID: Ivan Wyatt is a 72 y o  male with c/o of low back pain  He presents s/p L-spine flex/ext xray  He was noted to have some brisk reflexes previously on examination and also underwent MRI C/T-spine  He has not pursued Orthopedic evaluation of right shoulder  Hx of right shoulder pain and right shoulder MRI with partial thickness tear of supraspinatus tendon and arthritic changes involving the acromioclavicular joint  Mr Campos Baer is accompanied with his son  Patient is a limited historian  The Language line  (# [id number] [facility]) was utilized for [address] translation  HPI  In review -- patient began in June 2017 with right shoulder pain and low back pain after lifting a trash bag at work  He had right shoulder MRI and L-spine MRI completed in Oct  2017  He completed approximately 6 months of physical therapy with little relief    He previously reported a right shoulder injection did not alleviate his shoulder pain  He has not seen an Orthopedic provider  He had history of low back injection (L3, L4, L5, S1) in Oct  2017 with some relief for a few days  Previously saw Dr Елена Wagoner of Pain Management but reports has not followed up with him recently  Patient reports left sided low back pain which waxes/wanes in degree  He currently rates LBP as 4-5/10 on pain scale  Laying aggravates his back pain  Sitting sometimes helps his back pain but then can aggravate his back pain after sitting durations  Changing positions provides some relief  He denies lower extremity pain  He does report an intermittent sensation of numbness over the anterior left thigh that he can notice when he rubs his left thigh  He denies weakness of his lower extremities  Patient reports he may experience a little bit of neck pain with slouching posture  Currently rates neck pain as 3/10 on pain scale  He reports placing cold water on neck, massaging neck, or lying down are alleviating factors  He continues with right shoulder pain with limited range of motion of his right shoulder  He denies any difficulty with coordination/feeding self  He denies numbness or tingling of his upper extremities or torso  Patient reports he is able to walk steps without handrail  He denies any problem with walking on flat surface  He reports he is able to control his bladder but does sometimes feel he has incomplete emptying  He denies bowel dysfunction  He denies saddle paresthesias  The following portions of the patient's history were reviewed and updated as appropriate: allergies, current medications, past family history, past medical history, past social history and past surgical history  Review of Systems   Constitutional: Negative for fever  Respiratory: Negative for shortness of breath      Gastrointestinal:        See HPI   Genitourinary:        See HPI   Musculoskeletal: Positive for back pain and neck pain  Neurological:        See HPI   Psychiatric/Behavioral: Negative for agitation  Objective:      /80 (BP Location: Right arm, Patient Position: Sitting, Cuff Size: Standard)   Pulse 62   Temp 97 9 °F (36 6 °C) (Tympanic)   Ht 5' 2" (1 575 m)   Wt 64 5 kg (142 lb 3 2 oz)   BMI 26 01 kg/m²           Physical Exam   Constitutional: He appears well-developed and well-nourished  No distress  Pulmonary/Chest: Effort normal    Musculoskeletal:        Cervical back: He exhibits no tenderness and no deformity  Thoracic back: He exhibits no tenderness and no deformity  Lumbar back: He exhibits no tenderness and no deformity  Neurological: He is alert  Gait normal    Reflex Scores:       Tricep reflexes are 1+ on the right side and 2+ on the left side  Bicep reflexes are 2+ on the right side and Left biceps reflex grade: +2-3  [place] Brachioradialis reflexes are 2+ on the right side and 2+ on the left side  Patellar reflexes are 3+ on the right side and 3+ on the left side  Achilles reflexes are 2+ on the right side and 2+ on the left side  Psychiatric: He has a normal mood and affect  Neurologic Exam     Mental Status   Speech: ([place] speaking )  Level of consciousness: alert    Motor Exam   Motor:  Shoulder abduction left 5/5 with FROM  Shoulder abduction right 4+/5 with limited ROM to about 110 degrees  Elbow flexion/extension 5/5 bilaterally  Wrist flexion/extension 5/5 bilaterally  Finger  / abduction 5/5 bilaterally  Hip flexion/abduction/adduction 5/5 bilaterally  Knee flexion/extension 5/5 bilaterally  Ankle DF/PF 5/5 bilaterally  Great toe DF 5/5 bilaterally  Sensory Exam   Vibration normal    Proprioception normal    Pinprick normal      Sensation to pinprick intact b/l upper extremities, torso, and b/l lower extremities  JPS and Vibratory sense intact b/l thumbs and great toes         Gait, Coordination, and Reflexes     Gait  Gait: normal (Independent )    Reflexes   Right brachioradialis: 2+  Left brachioradialis: 2+  Right biceps: 2+  Left biceps reflex grade: +2-3  Right triceps: 1+  Left triceps: 2+  Right patellar: 3+  Left patellar: 3+  Right achilles: 2+  Left achilles: 2+  Right plantar: normal  Left plantar: normal  Right Muñoz: absent  Left Muñoz: absent  Right ankle clonus: absent  Left ankle clonus present: 1-2  Imaging study:    8/24/18 [facility]) L-spine xray -- per report: " LUMBAR SPINE     INDICATION:   M54 5: Low back pain  G89 29: Other chronic pain  M79 651: Pain in right thigh  M47 816: Spondylosis without myelopathy or radiculopathy, lumbar region      COMPARISON:  June 6, 2017     VIEWS:  XR SPINE LUMBAR MINIMUM 4 VIEWS NON INJURY  Images: 4     FINDINGS: Normal lordosis of lumbar spine  There is a wedge compression deformity of T12 which is stable from June 2017 and causes abrupt kyphosis at this level      Complex S-shaped scoliotic curvature the lumbar component is concave right  No spondylolisthesis  No instability with flexion or extension      There is no evidence of acute fracture or destructive osseous lesion      Mild degenerative disc and degenerative facet joint changes commensurate with patient's age      Pedicles appear symmetric      Mild degenerative changes both sacroiliac joints      Visualized lungs are clear  Visualized bowel gas pattern is unremarkable    Visualized cardiac silhouette is normal            IMPRESSION:  No change from June 2017  Old anterior wedge compression fracture of T12 is stable      Workstation performed: [id number] "      __________________________________________________________________________    6/7/18 [facility]) T-spine MRI -- per report: " MRI THORACIC SPINE WITHOUT CONTRAST     INDICATION: R29 2: Abnormal reflex     COMPARISON:  None      TECHNIQUE:  Sagittal T1, sagittal T2, sagittal inversion recovery, axial T2,  axial 2D MERGE      IMAGE QUALITY: Diagnostic      FINDINGS:     ALIGNMENT: Straightening of normal thoracic kyphosis, right convex mid thoracic scoliosis, chronic wedge deformity L1 with 4050% loss of height anteriorly  No bony retropulsion or canal stenosis      MARROW SIGNAL:  Normal marrow signal is identified within the visualized bony structures  No discrete marrow lesion      THORACIC CORD: Normal signal within the thoracic cord  Cord terminates at the upper L2 level      PARAVERTEBRAL SOFT TISSUES:  Normal      THORACIC DEGENERATIVE CHANGE: Mild multilevel degenerative changes which do not result in cord or nerve root compression at any level      IMPRESSION:     No compressive cord or root disease  Right convex mid dorsal scoliosis, straightening of normal thoracic kyphosis, sclerotic anterior wedge deformity L1            Workstation performed: [id number] "    __________________________________________________________________________      6/7/18 [facility] C-spine MRI -- per report: " MRI CERVICAL SPINE WITHOUT CONTRAST     INDICATION: R29 2: Abnormal reflex limited range of motion right shoulder,     COMPARISON:  None      TECHNIQUE:  Sagittal T1, sagittal T2, sagittal inversion recovery, axial T2, axial  2D merge     IMAGE QUALITY:  Diagnostic     FINDINGS:     ALIGNMENT:  Straightening of cervical lordosis with the minor retrolisthesis of C2-C3 on C4  Fusion of the C2 and C3 vertebral bodies and the posterior elements are noted  The odontoid appears elongated and appears retroverted  Tip of the odontoid is   7 mm above Albia's line consistent with basilar patchy radiation  No significant stenosis of the canal which exceeds 2 cm in AP dimension        MARROW SIGNAL:  Normal marrow signal is identified within the visualized bony structures    No discrete marrow lesion      CERVICAL AND VISUALIZED THORACIC CORD:  Normal signal within the visualized cord      PREVERTEBRAL AND PARASPINAL SOFT TISSUES: Normal      VISUALIZED POSTERIOR FOSSA:  The visualized posterior fossa demonstrates no abnormal signal      CERVICAL DISC SPACES:     C2-C3:  C2 and C3 vertebral bodies are fused  The foramen are patent      C3-C4:  Asymmetric right facet and uncinate disease with severe right and moderately severe left foraminal stenosis      C4-C5:  Central broad-based protrusion, AP dimension of the sac approaches 8-9 mm      C5-C6:  Marginal osteophytes and circumferential bulge  AP dimension of the sac reduced to 7-8 mm      C6-C7:  Mild bilateral facet and uncinate arthrosis  Marginal osteophytes  No significant stenosis      C7-T1:  Bilateral facet arthrosis, marginal osteophytes      UPPER THORACIC DISC SPACES:  Normal      IMPRESSION:     Congenital fusion C2-C3, 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

## 2018-08-24 NOTE — PROGRESS NOTES
Assessment/Plan:     Diagnoses and all orders for this visit:    Chronic low back pain without sciatica, unspecified back pain laterality  -     Ambulatory referral to Pain Management; Future    Closed wedge compression fracture of twelfth thoracic vertebra, sequela  Comments:  chronic compression deformity T12  Orders:  -     Ambulatory referral to Pain Management; Future    Lumbar spondylosis  -     Ambulatory referral to Pain Management; Future    Osteoarthritis of cervical spine, unspecified spinal osteoarthritis complication status    Chronic right shoulder pain  -     Ambulatory referral to Orthopedic Surgery; Future            Discussion / Summary: This is a 72 y o  male who presents for follow up s/p completion of MRI C/T-spine MRIs and L-spine Xray  Mr Regi gilmore c/o is low back pain  C-spine MRI ( 6/7/18 ), T-spine MRI 6/7/18) and L-spine xray (8/24/18)  was reviewed in detail by Dr Elsie Patel  There is normal signal within visualized cord  The dens is in close proximity to clivus but no significant stenosis of canal  Dr Elsie Patel does not recommend neurosurgical intervention in regards to c-spine  There is no instability on lumbar flex/ext imaging  There is presence of chronic compression deformity of T12 vertebral body  Surgical intervention (ie  T10-L2 instrumented fixation and T12 corpectomy) is not a guarantee of alleviating Mr Regi Zavala back pain and Dr Elsie Patel does not recommend neurosurgical intervention at this juncture  Mr Nadia Navarro is advised to pursue further management with Pain Management  Patient reports he wishes to pursue Pain Management care with a new pain management provider so referral to [facility] has been provided    He is also encouraged to pursue Orthopedic evaluation (as also referred at last visit) in setting of right shoulder pain / history of partial thickness tear of supraspinatus tendon and arthritic changes involving the acromioclavicular joint  Patient is to contact our office or present to hospital Emergency Room if experience worsening or new neck/back pain, numbness/tinging of upper extremities, difficulty with coordination hands/feeding self,  sensory or motor change, gait change, bladder or bowel dysfunction, or other neurological change  Patient and son expressed understanding and agreement     _________________________________________________________________________________    Subjective:      Patient ID: Michel Love is a 72 y o  male with c/o of low back pain  He presents s/p L-spine flex/ext xray  He was noted to have some brisk reflexes previously on examination and also underwent MRI C/T-spine  He has not pursued Orthopedic evaluation of right shoulder  Hx of right shoulder pain and right shoulder MRI with partial thickness tear of supraspinatus tendon and arthritic changes involving the acromioclavicular joint  Mr Vicky Villegas is accompanied with his son  Patient is a limited historian  The Language line  (# [id number] [facility]) was utilized for [place] translation  HPI  In review -- patient began in June 2017 with right shoulder pain and low back pain after lifting a trash bag at work  He had right shoulder MRI and L-spine MRI completed in Oct  2017  He completed approximately 6 months of physical therapy with little relief  He previously reported a right shoulder injection did not alleviate his shoulder pain  He has not seen an Orthopedic provider  He had history of low back injection (L3, L4, L5, S1) in Oct  2017 with some relief for a few days  Previously saw Dr Naseem Miramontes of Pain Management but reports has not followed up with him recently  Patient reports left sided low back pain which waxes/wanes in degree  He currently rates LBP as 4-5/10 on pain scale  Laying aggravates his back pain    Sitting sometimes helps his back pain but then can aggravate his back pain after sitting durations  Changing positions provides some relief  He denies lower extremity pain  He does report an intermittent sensation of numbness over the anterior left thigh that he can notice when he rubs his left thigh  He denies weakness of his lower extremities  Patient reports he may experience a little bit of neck pain with slouching posture  Currently rates neck pain as 3/10 on pain scale  He reports placing cold water on neck, massaging neck, or lying down are alleviating factors  He continues with right shoulder pain with limited range of motion of his right shoulder  He denies any difficulty with coordination/feeding self  He denies numbness or tingling of his upper extremities or torso  Patient reports he is able to walk steps without handrail  He denies any problem with walking on flat surface  He reports he is able to control his bladder but does sometimes feel he has incomplete emptying  He denies bowel dysfunction  He denies saddle paresthesias  The following portions of the patient's history were reviewed and updated as appropriate: allergies, current medications, past family history, past medical history, past social history and past surgical history  Review of Systems   Constitutional: Negative for fever  Respiratory: Negative for shortness of breath  Gastrointestinal:        See HPI   Genitourinary:        See HPI   Musculoskeletal: Positive for back pain and neck pain  Neurological:        See HPI   Psychiatric/Behavioral: Negative for agitation  Objective:      /80 (BP Location: Right arm, Patient Position: Sitting, Cuff Size: Standard)   Pulse 62   Temp 97 9 °F (36 6 °C) (Tympanic)   Ht 5' 2" (1 575 m)   Wt 64 5 kg (142 lb 3 2 oz)   BMI 26 01 kg/m²          Physical Exam   Constitutional: He appears well-developed and well-nourished  No distress     Pulmonary/Chest: Effort normal    Musculoskeletal:        Cervical back: He exhibits no tenderness and no deformity  Thoracic back: He exhibits no tenderness and no deformity  Lumbar back: He exhibits no tenderness and no deformity  Neurological: He is alert  Gait normal    Reflex Scores:       Tricep reflexes are 1+ on the right side and 2+ on the left side  Bicep reflexes are 2+ on the right side and Left biceps reflex grade: +2-3  Vandana Settle Brachioradialis reflexes are 2+ on the right side and 2+ on the left side  Patellar reflexes are 3+ on the right side and 3+ on the left side  Achilles reflexes are 2+ on the right side and 2+ on the left side  Psychiatric: He has a normal mood and affect  Neurologic Exam     Mental Status   Speech: ([place]ian speaking )  Level of consciousness: alert    Motor Exam   Motor:  Shoulder abduction left 5/5 with FROM  Shoulder abduction right 4+/5 with limited ROM to about 110 degrees  Elbow flexion/extension 5/5 bilaterally  Wrist flexion/extension 5/5 bilaterally  Finger  / abduction 5/5 bilaterally  Hip flexion/abduction/adduction 5/5 bilaterally  Knee flexion/extension 5/5 bilaterally  Ankle DF/PF 5/5 bilaterally  Great toe DF 5/5 bilaterally  Sensory Exam   Vibration normal    Proprioception normal    Pinprick normal      Sensation to pinprick intact b/l upper extremities, torso, and b/l lower extremities  JPS and Vibratory sense intact b/l thumbs and great toes  Gait, Coordination, and Reflexes     Gait  Gait: normal (Independent )    Reflexes   Right brachioradialis: 2+  Left brachioradialis: 2+  Right biceps: 2+  Left biceps reflex grade: +2-3  Right triceps: 1+  Left triceps: 2+  Right patellar: 3+  Left patellar: 3+  Right achilles: 2+  Left achilles: 2+  Right plantar: normal  Left plantar: normal  Right Muñoz: absent  Left Muñoz: absent  Right ankle clonus: absent  Left ankle clonus present: 1-2          Imaging study:    8/24/18 [facility] L-spine xray -- per report: " LUMBAR SPINE     INDICATION:   M54 5: Low back pain  G89 29: Other chronic pain  M79 651: Pain in right thigh  M47 816: Spondylosis without myelopathy or radiculopathy, lumbar region      COMPARISON:  June 6, 2017     VIEWS:  XR SPINE LUMBAR MINIMUM 4 VIEWS NON INJURY  Images: 4     FINDINGS: Normal lordosis of lumbar spine  There is a wedge compression deformity of T12 which is stable from June 2017 and causes abrupt kyphosis at this level      Complex S-shaped scoliotic curvature the lumbar component is concave right  No spondylolisthesis  No instability with flexion or extension      There is no evidence of acute fracture or destructive osseous lesion      Mild degenerative disc and degenerative facet joint changes commensurate with patient's age      Pedicles appear symmetric      Mild degenerative changes both sacroiliac joints      Visualized lungs are clear  Visualized bowel gas pattern is unremarkable  Visualized cardiac silhouette is normal            IMPRESSION:  No change from June 2017  Old anterior wedge compression fracture of T12 is stable      Workstation performed: [id number] "      __________________________________________________________________________    6/7/18 [facility] T-spine MRI -- per report: " MRI THORACIC SPINE WITHOUT CONTRAST     INDICATION: R29 2: Abnormal reflex     COMPARISON:  None      TECHNIQUE:  Sagittal T1, sagittal T2, sagittal inversion recovery, axial T2,  axial 2D MERGE      IMAGE QUALITY: Diagnostic      FINDINGS:     ALIGNMENT: Straightening of normal thoracic kyphosis, right convex mid thoracic scoliosis, chronic wedge deformity L1 with 4050% loss of height anteriorly  No bony retropulsion or canal stenosis      MARROW SIGNAL:  Normal marrow signal is identified within the visualized bony structures  No discrete marrow lesion      THORACIC CORD: Normal signal within the thoracic cord    Cord terminates at the upper L2 level      PARAVERTEBRAL SOFT TISSUES: Normal      THORACIC DEGENERATIVE CHANGE: Mild multilevel degenerative changes which do not result in cord or nerve root compression at any level      IMPRESSION:     No compressive cord or root disease  Right convex mid dorsal scoliosis, straightening of normal thoracic kyphosis, sclerotic anterior wedge deformity L1            Workstation performed: [id number] "    __________________________________________________________________________      6/7/18 [facility] C-spine MRI -- per report: " MRI CERVICAL SPINE WITHOUT CONTRAST     INDICATION: R29 2: Abnormal reflex limited range of motion right shoulder,     COMPARISON:  None      TECHNIQUE:  Sagittal T1, sagittal T2, sagittal inversion recovery, axial T2, axial  2D merge     IMAGE QUALITY:  Diagnostic     FINDINGS:     ALIGNMENT:  Straightening of cervical lordosis with the minor retrolisthesis of C2-C3 on C4  Fusion of the C2 and C3 vertebral bodies and the posterior elements are noted  The odontoid appears elongated and appears retroverted  Tip of the odontoid is   7 mm above Zarephath's line consistent with basilar patchy radiation  No significant stenosis of the canal which exceeds 2 cm in AP dimension        MARROW SIGNAL:  Normal marrow signal is identified within the visualized bony structures  No discrete marrow lesion      CERVICAL AND VISUALIZED THORACIC CORD:  Normal signal within the visualized cord      PREVERTEBRAL AND PARASPINAL SOFT TISSUES:  Normal      VISUALIZED POSTERIOR FOSSA:  The visualized posterior fossa demonstrates no abnormal signal      CERVICAL DISC SPACES:     C2-C3:  C2 and C3 vertebral bodies are fused  The foramen are patent      C3-C4:  Asymmetric right facet and uncinate disease with severe right and moderately severe left foraminal stenosis      C4-C5:  Central broad-based protrusion, AP dimension of the sac approaches 8-9 mm      C5-C6:  Marginal osteophytes and circumferential bulge    AP dimension of the sac reduced to 7-8 mm      C6-C7:  Mild bilateral facet and uncinate arthrosis  Marginal osteophytes  No significant stenosis      C7-T1:  Bilateral facet arthrosis, marginal osteophytes      UPPER THORACIC DISC SPACES:  Normal      IMPRESSION:     Congenital fusion C2-C3, 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

## 2018-08-24 NOTE — PROGRESS NOTES
Assessment/Plan:   Makayla Higgins is a 72 y o male who comes in today for postoperative check after  on   Doing well after umbilical hernia repair  Sutures removed  Postop care was instructions discussed with him and his friend in [facility] [place]  Pathology: Reviewed with patient, all questions answered  Postoperative restrictions reviewed, including specific lifting and exercise restrictions  All questions answered  He did have some urinary retention postoperatively requiring a Alcaraz catheter  This was removed in use had no problems since then and is moving his bowels normally  ______________________________________________________  HPI:  Makayla Higgins is a 72 y o male who comes in today for postoperative check after recent  on   Currently doing well without problems, no fever or chills,no nausea and no vomiting  Reports   ROS:  General ROS: negative for - chills, fatigue, fever or night sweats, weight loss  Respiratory ROS: no cough, shortness of breath, or wheezing  Cardiovascular ROS: no chest pain or dyspnea on exertion  Genito-Urinary ROS: no dysuria, trouble voiding, or hematuria  Musculoskeletal ROS: negative for - gait disturbance, joint pain or muscle pain  Neurological ROS: no TIA or stroke symptoms  GI ROS: see HPI  Skin ROS: no new rashes or lesions   Lymphatic ROS: no new adenopathy noted by pt     GYN ROS: see HPI, no new GYN history or bleeding noted  Psy ROS: no new mental or behavioral disturbances       Patient Active Problem List   Diagnosis    Low back pain    Right shoulder pain    Right thigh pain    Umbilical hernia without obstruction and without gangrene    Acute urinary retention         Aspirin      Current Outpatient Prescriptions:     docusate sodium (COLACE) 100 mg capsule, Take 1 capsule (100 mg total) by mouth 2 (two) times a day, Disp: 20 capsule, Rfl: 0    tamsulosin (FLOMAX) 0 4 mg, Take 1 capsule (0 4 mg total) by mouth daily with dinner for 10 days, Disp: 10 capsule, Rfl: 0    HYDROcodone-acetaminophen (NORCO) 5-325 mg per tablet, Take 1-2 tablets by mouth every 4 (four) hours as needed for pain for up to 25 doses Max Daily Amount: 12 tablets (Patient not taking: Reported on 8/24/2018 ), Disp: 30 tablet, Rfl: 0    Past Medical History:   Diagnosis Date    Hypertension     occ elevated per son - on no meds    Low back pain     Right shoulder pain     Umbilical hernia without obstruction or gangrene     Urinary retention     Uses Upper sorbian as primary spoken language     son translated thru translation line    Wears glasses        Past Surgical History:   Procedure Laterality Date    GA REPAIR UMBILICAL JDOF,4+V/W,CCXSC N/A 8/9/2018    Procedure: REPAIR HERNIA UMBILICAL;  Surgeon: Roberto Torres MD;  [facility]: [facility] OR;  Service: General       Family History   Problem Relation Age of Onset    Cancer Mother         reports that he has never smoked  He has never used smokeless tobacco  He reports that he drinks alcohol  He reports that he does not use drugs  PHYSICAL EXAM  General: normal, cooperative, no distress  Abdominal: soft, nondistended or nontender  Incision: clean, dry, and intact and healing well      Some portions of this record may have been generated with voice recognition software  There may be translation, syntax,  or grammatical errors  Occasional wrong word or "sound-a-like" substitutions may have occurred due to the inherent limitations of the voice recognition software  Read the chart carefully and recognize, using context, where substitutions may have occurred  If you have any questions, please contact the dictating provider for clarification or correction, as needed  This encounter has been coded by a non-certified coder         Roberto Torres MD    Date: 8/24/2018 Time: 9:56 AM

## 2018-08-24 NOTE — LETTER
August 24, 2018     Mariela Patterson, [address]  [address]    Patient: Quiana Zarate   YOB: 1945   Date of Visit: 8/24/2018       Dear Dr Aryan Mendiola:    Thank you for referring Quiana Zarate to me for evaluation  Below are my notes for this consultation  If you have questions, please do not hesitate to call me  I look forward to following your patient along with you  Sincerely,        Angi Dominguez MD        CC: No Recipients  Angi Dominguez MD  8/24/2018  9:57 AM  Sign at close encounter  Assessment/Plan:   Quiana Zarate is a 72 y o male who comes in today for postoperative check after  on   Doing well after umbilical hernia repair  Sutures removed  Postop care was instructions discussed with him and his friend in [facility] [place]  Pathology: Reviewed with patient, all questions answered  Postoperative restrictions reviewed, including specific lifting and exercise restrictions  All questions answered  He did have some urinary retention postoperatively requiring a Alcaraz catheter  This was removed in use had no problems since then and is moving his bowels normally  ______________________________________________________  HPI:  Quiana Zarate is a 72 y o male who comes in today for postoperative check after recent  on   Currently doing well without problems, no fever or chills,no nausea and no vomiting  Reports   ROS:  General ROS: negative for - chills, fatigue, fever or night sweats, weight loss  Respiratory ROS: no cough, shortness of breath, or wheezing  Cardiovascular ROS: no chest pain or dyspnea on exertion  Genito-Urinary ROS: no dysuria, trouble voiding, or hematuria  Musculoskeletal ROS: negative for - gait disturbance, joint pain or muscle pain  Neurological ROS: no TIA or stroke symptoms  GI ROS: see HPI  Skin ROS: no new rashes or lesions   Lymphatic ROS: no new adenopathy noted by pt     GYN ROS: see HPI, no new GYN history or bleeding noted  Psy ROS: no new mental or behavioral disturbances       Patient Active Problem List   Diagnosis    Low back pain    Right shoulder pain    Right thigh pain    Umbilical hernia without obstruction and without gangrene    Acute urinary retention         Aspirin      Current Outpatient Prescriptions:     docusate sodium (COLACE) 100 mg capsule, Take 1 capsule (100 mg total) by mouth 2 (two) times a day, Disp: 20 capsule, Rfl: 0    tamsulosin (FLOMAX) 0 4 mg, Take 1 capsule (0 4 mg total) by mouth daily with dinner for 10 days, Disp: 10 capsule, Rfl: 0    HYDROcodone-acetaminophen (NORCO) 5-325 mg per tablet, Take 1-2 tablets by mouth every 4 (four) hours as needed for pain for up to 25 doses Max Daily Amount: 12 tablets (Patient not taking: Reported on 8/24/2018 ), Disp: 30 tablet, Rfl: 0    Past Medical History:   Diagnosis Date    Hypertension     occ elevated per son - on no meds    Low back pain     Right shoulder pain     Umbilical hernia without obstruction or gangrene     Urinary retention     Uses Irish as primary spoken language     son translated thru translation line    Wears glasses        Past Surgical History:   Procedure Laterality Date    NE REPAIR UMBILICAL DFPO,9+T/S,DTFCI N/A 8/9/2018    Procedure: REPAIR HERNIA UMBILICAL;  Surgeon: Tony Shoemaker MD;  Location: [facility];  Service: General       Family History   Problem Relation Age of Onset    Cancer Mother         reports that he has never smoked  He has never used smokeless tobacco  He reports that he drinks alcohol  He reports that he does not use drugs  PHYSICAL EXAM  General: normal, cooperative, no distress  Abdominal: soft, nondistended or nontender  Incision: clean, dry, and intact and healing well      Some portions of this record may have been generated with voice recognition software  There may be translation, syntax,  or grammatical errors   Occasional wrong word or "sound-a-like" substitutions may have occurred due to the inherent limitations of the voice recognition software  Read the chart carefully and recognize, using context, where substitutions may have occurred  If you have any questions, please contact the dictating provider for clarification or correction, as needed  This encounter has been coded by a non-certified coder         José Miguel Bryant MD    Date: 8/24/2018 Time: 9:56 AM

## 2018-09-07 ENCOUNTER — OFFICE VISIT (OUTPATIENT)
Dept: FAMILY MEDICINE CLINIC | Facility: CLINIC | Age: 73
End: 2018-09-07
Payer: COMMERCIAL

## 2018-09-07 VITALS
WEIGHT: 140 LBS | HEART RATE: 71 BPM | TEMPERATURE: 97.6 F | RESPIRATION RATE: 16 BRPM | BODY MASS INDEX: 25.61 KG/M2 | OXYGEN SATURATION: 98 % | DIASTOLIC BLOOD PRESSURE: 90 MMHG | SYSTOLIC BLOOD PRESSURE: 142 MMHG

## 2018-09-07 DIAGNOSIS — Z23 ENCOUNTER FOR IMMUNIZATION: ICD-10-CM

## 2018-09-07 DIAGNOSIS — Z12.11 ENCOUNTER FOR SCREENING COLONOSCOPY: Primary | ICD-10-CM

## 2018-09-07 DIAGNOSIS — I10 ESSENTIAL HYPERTENSION: ICD-10-CM

## 2018-09-07 PROBLEM — R03.0 ELEVATED BLOOD-PRESSURE READING WITHOUT DIAGNOSIS OF HYPERTENSION: Status: ACTIVE | Noted: 2017-04-11

## 2018-09-07 PROBLEM — H26.9 CATARACT: Status: ACTIVE | Noted: 2017-04-11

## 2018-09-07 PROCEDURE — 90670 PCV13 VACCINE IM: CPT | Performed by: FAMILY MEDICINE

## 2018-09-07 PROCEDURE — 90471 IMMUNIZATION ADMIN: CPT | Performed by: FAMILY MEDICINE

## 2018-09-07 PROCEDURE — 99203 OFFICE O/P NEW LOW 30 MIN: CPT | Performed by: FAMILY MEDICINE

## 2018-09-07 PROCEDURE — 3725F SCREEN DEPRESSION PERFORMED: CPT | Performed by: FAMILY MEDICINE

## 2018-09-07 PROCEDURE — 4040F PNEUMOC VAC/ADMIN/RCVD: CPT | Performed by: FAMILY MEDICINE

## 2018-09-07 NOTE — PATIENT INSTRUCTIONS
Colonoscopia   CUIDADO AMBULATORIO:   Lo que necesita saber sobre valentino colonoscopia:  Valentino colonoscopia es un procedimiento para examinar con un endoscopio el interior de upton colon (intestino)  La sonda es un tubo flexible con valentino dexter pequeña y Evalene Chávez en la punta  Luz valentino colonoscopia, es posible que le retiren pólipos o crecimientos de tejidos  Lo que debe hacer la semana antes de la colonoscopia:  Usted necesitará dejar de marquez los medicamentos que contienen aspirina o jory luz 7 días antes de upton colonoscopia  Si usted kaylah anticoagulantes, jens warfarina, pregunte cuándo debería dejar de tomarlos  Póngase de acuerdo con alguien para que lo lleve a upton casa después del procedimiento  Cómo prepararse para la colonoscopia:  Upton médico le pedirá que prepare lynnette intestinos antes de upton procedimiento  Lynnette intestinos necesitarán estar vacíos antes de upton procedimiento para permitirle que ainsley upton colon claramente  Usted necesitará hacer lo siguiente el día antes de upton procedimiento:  · Solo tome líquidos melida  todo el día antes de upton colonoscopia  La dieta de líquidos melida incluye jugos de fruta y caldos livianos, gelatina saborizada Eden Bienenstock y caramelos duros  También incluye café, té, bebidas gaseosas y bebidas deportivas claras  · Siga la preparación de lynnette intestinos jens se le indicó  Existen diferentes preparaciones que le pueden bernabe para antes de valentino colonoscopia  Algunas se administran por 2 horas y otras por más de 6 horas  Algunas se administran temprano en la tarde del día anterior a la colonoscopía  Otras se administran el día antes y luego en la mañana de la colonoscopia  Con cualquier preparación de intestinos, permanezca cerca del baño  [place] líquido le provocará que tenga evacuaciones intestinales frecuentemente  · Un enema  podría ser necesaria  Upton médico podría indicarle que use un enema para limpiar lynnette intestinos  · No coma o tome nada después de la patrice Ortega a evitar problemas que pueden suceder si usted vomita mientras está bajo anestesia  Qué sucederá luz la colonoscopia:   · Le administrarán medicamento para ayudarlo a relajarse  Se recostará sobre el costado larisa y [facility] [address] rangel pecho  Rangel médico le examinará el ano y utilizará un dedo para revisar rangel recto  Si rangel intestino no está vacío le pueden administrar otro enema  El colonoscopio se Jackquline Phalen y se colocará suavemente en rangel ano  Luego se pasará por el recto hacia el colon  [place] agua o aire en rangel colon para ayudar a limpiarlo o ensancharlo  [place] lo realizará rangel médico para poder observar con claridad rangel colon  · Se pueden marquez muestras de tejido de las ayon de rangel intestino y enviarlas al laboratorio para ser analizadas  En perry de tener pólipos, rangel médico utiliza un alambre con valentino argolla al final que pasará por el interior del endoscopio y lo usará para sostener el pólipo  Luego el pólipo será quemado o cortado de la pared del colon  Los pólipos extraídos serán enviados al laboratorio para ser [employer]  Le pueden marquez imágenes del colon luz el procedimiento  El endoscopio será retirado cuando el procedimiento se termine  Qué sucederá después de la colonoscopia:   · Descanse después de rangel procedimiento  Usted podría sentirse inflamado, tener gases y [place] abdominal  Es posible que necesite acostarse sobre rangel costado derecho con valentino almohada térmica sobre rangel abdomen  Posiblemente necesite caminar un poco para ayudarse a expulsar los gases  Si se siente inflamado, coma comidas pequeñas  No maneje o tome decisiones importantes hasta el día siguiente de rangel procedimiento  · Es posible que le extraigan los pólipos  No tome aspirina o realice viajes largos en kathia dentro de los 7 días después de rangel procedimiento  Pregunte a rangel médico acerca de cualquier otras limitaciones después de rangel procedimiento    Los riesgos de valentino colonoscopía:  Usted podría sentir dolor o sangrar después de que remuevan el endoscopio y los pólipos  Es posible que también tenga latido cardíaco lento, disminución de la presión arterial o aumento de la sudoración  Rangel colon podría sufrir un desgarre debido al aumento de presión del endoscopio y de otros instrumentos  [place] podría provocar que el contenido de basia intestinos se vacíe de ragnel colon a rangel abdomen  Si esto sucede, usted tendrá [facility] y [facility] cirugía en rangel colon  Busque atención médica de inmediato si:   · Usted presenta valentino cantidad vipul de destinee alyse brillante en basia evacuaciones intestinales  · Rangel abdomen está evans y firme y usted siente dolor intenso  · Usted tiene dificultad repentina para respirar  Pregúntele a rangel José Alan vitaminas y minerales son adecuados para usted  · Usted presenta sarpullido o urticaria  · Usted tiene fiebre dentro de las 24 horas después de rangel procedimiento  · Usted no ha tenido valentino evacuación intestinal después de 3 días de rangel procedimiento  · Usted tiene preguntas o inquietudes acerca de rangel condición o cuidado  Actividad:   · No levante nada, no se esfuerce o corra  por 3 días después de rangel procedimiento  · Descanse después de rangel procedimiento  A usted le multani administrado medicamento para relajarse  No  maneje o tome decisiones importantes hasta el día siguiente de rangel procedimiento  Regrese a basia actividades normales según le indiquen  · Alivie los gases y la incomodidad de la inflamación  acostándose en rangel costado derecho con valentino almohada térmica sobre rangel abdomen  Es posible que necesite caminar un poco para ayudar a eliminar los gases  Coma comidas pequeñas hasta que se alivie de la inflamación  Si a usted le removieron pólipos:  Por 7 días después de rangel procedimiento:  · No  tome aspirina  · No  realice paseos largos en kathia  Ayude a prevenir el estreñimiento:   · Consuma alimentos saludables y variados    Los alimentos saludables incluyen fruta, vegetales, panes integrales, productos lácteos bajo en grasa, frijoles, carmel sin grasa, y pescado  Pregunte si necesita seguir valentino dieta especial  Upton médico puede recomendarle que coma alimentos ricos en fibra, jens frijoles cocidos  La fibra lo ayuda a tener evacuaciones intestinales regulares  · [address] se le haya indicado  Los adultos deberían de beber entre 9 a 13 vasos de 8 onzas de líquidos cada día  Pregunte cuál es la cantidad [place] para usted  Para [facility], los mejores líquidos son Kevon Singer, y [place]  · Ejercítese según indicaciones  Consulte con upton médico acerca de cuál es el mejor régimen de ejercicio para usted  El ejercicio puede ayudar a prevenir estreñimiento, reducir upton presión arterial y [employer]  Acuda a basia consultas de control con upton médico según le indicaron  Anote basia preguntas para que se acuerde de hacerlas luz basia visitas  © 2017 [address] Information is for End User's use only and may not be sold, redistributed or otherwise used for commercial purposes  All illustrations and images included in CareNotes® are the copyrighted property of A D A M , Inc  or Martir Hull  Esta información es sólo para uso en educación  Upton intención no es darle un consejo médico sobre enfermedades o tratamientos  Colsulte con upton Abeba Haskins farmacéutico antes de seguir cualquier régimen médico para saber si es seguro y efectivo para usted

## 2018-09-07 NOTE — ASSESSMENT & PLAN NOTE
Controlled off of medication, for age goal 150/90  Reviewed blood pressure target goal with patient  Continue to maintain healthy balanced diet with focus on low-salt intake  Limit alcohol intake

## 2018-09-07 NOTE — PROGRESS NOTES
Assessment/Plan:    Hypertension  Controlled off of medication, for age goal 150/90  Reviewed blood pressure target goal with patient  Continue to maintain healthy balanced diet with focus on low-salt intake  Limit alcohol intake  Screening for colon cancer  Discussed that due to age and risk factors patient is in need of a colonoscopy to screen for colon cancer  Patient verbalized understanding and is willing  Referral made today  Continue to monitor and report any change in stool  Diagnoses and all orders for this visit:    Encounter for screening colonoscopy  -     Ambulatory referral to Gastroenterology; Future    Encounter for immunization  -     PNEUMOCOCCAL CONJUGATE VACCINE 13-VALENT LESS THAN 5Y0  (Prevnar 13)    Essential hypertension          Subjective:      Patient ID: Frederick Reyes is a 72 y o  male  71 yo gentleman presents today to [facility] care  Recently seen for abdominal pain in clinic and found to have incarcerated abdominal hernia that was operated on 8/9/2018, reports mild pain on flexion of abdominals, otherwise doing well  Had urinary retention post-op that has now resolved  He denies any acute concerns at this time  The following portions of the patient's history were reviewed and updated as appropriate: allergies, current medications, past family history, past medical history, past social history, past surgical history and problem list     Review of Systems   Constitutional: Negative for appetite change and fatigue  HENT: Negative for ear pain and sinus pressure  Eyes: Negative for pain  Respiratory: Negative for chest tightness and shortness of breath  Cardiovascular: Negative for chest pain and palpitations  Gastrointestinal: Positive for abdominal pain (muscular, on flexion, post op pain)  Negative for abdominal distention, blood in stool, constipation, diarrhea, nausea and vomiting  Genitourinary: Negative for flank pain and hematuria  Musculoskeletal: Negative for gait problem and joint swelling  Skin: Negative for color change  Neurological: Negative for speech difficulty and headaches  Psychiatric/Behavioral: Negative for hallucinations and suicidal ideas  Objective:      /90 (BP Location: Left arm, Patient Position: Sitting, Cuff Size: Adult)   Pulse 71   Temp 97 6 °F (36 4 °C) (Temporal)   Resp 16   Wt 63 5 kg (140 lb)   SpO2 98%   BMI 25 61 kg/m²          Physical Exam   Constitutional: He is oriented to person, place, and time  He appears well-developed and well-nourished  HENT:   Head: Normocephalic and atraumatic  Right Ear: External ear normal    Left Ear: External ear normal    Eyes: Conjunctivae and EOM are normal  Pupils are equal, round, and reactive to light  Neck: Normal range of motion  Neck supple  Cardiovascular: Normal rate, regular rhythm and intact distal pulses  Exam reveals no friction rub  Pulmonary/Chest: Effort normal and breath sounds normal  No respiratory distress  He has no wheezes  Abdominal: Soft  Bowel sounds are normal  He exhibits no distension  There is tenderness (on flexion from sitting up on exam table, no TTP)  Healing ridge under scar, no signs of infection  Musculoskeletal: Normal range of motion  He exhibits no edema or tenderness  Lymphadenopathy:     He has no cervical adenopathy  Neurological: He is alert and oriented to person, place, and time  Skin: Skin is warm and dry  Psychiatric: He has a normal mood and affect

## 2018-09-07 NOTE — ASSESSMENT & PLAN NOTE
Discussed that due to age and risk factors patient is in need of a colonoscopy to screen for colon cancer  Patient verbalized understanding and is willing  Referral made today  Continue to monitor and report any change in stool

## 2019-03-28 NOTE — TELEPHONE ENCOUNTER
Patient Spoke to son Mallorie Velasco in regards to his fathers appointment  I originally called to make sure Odessa Simmonds would be getting Mai Curio completed before his appointment on 7/27 with us but Mallorie Velasco said he is planning on going to \[facility]\"" and seeing a doctor out there since he cannot afford his appointment with us  I tried contacting Laura Simmonds to ask him directly what his plans were but I was not able to contact him to see if he would make it to his appointment  25.6

## 2025-05-15 NOTE — PROGRESS NOTES
Assessment/Plan:    Acute urinary retention    Voiding trial tomorrow  Diagnoses and all orders for this visit:    Acute urinary retention    Other specified counseling          Subjective:      Patient ID: Frederick Reyes is a 72 y o  male  HPI  Urinary retention:  The patient developed urinary retention immediately following umbilical hernia repair  His catheter was removed for two voiding trials about a week ago and had to be replaced  Cathed for 850 cc  Tamsulosin started by ER  The patient presents now for evaluation  Pre-op, no nocturia, dysuria or blood  Occas urgency  Dtr translates  The following portions of the patient's history were reviewed and updated as appropriate: allergies, current medications, past family history, past medical history, past social history, past surgical history and problem list     Review of Systems   Constitutional: Negative for activity change and fatigue  Respiratory: Negative for shortness of breath and wheezing  Cardiovascular: Negative for chest pain  Gastrointestinal: Negative for abdominal pain  Genitourinary: Negative for difficulty urinating, dysuria, frequency, hematuria and urgency  Musculoskeletal: Negative for back pain and gait problem  Skin: Negative  Allergic/Immunologic: Negative  Neurological: Negative  Psychiatric/Behavioral: Negative  Objective:      /84 (BP Location: Left arm, Patient Position: Sitting, Cuff Size: Standard)   Ht 5' 2" (1 575 m)   Wt 63 kg (139 lb)   BMI 25 42 kg/m²          Physical Exam   Constitutional: He is oriented to person, place, and time  He appears well-developed and well-nourished  HENT:   Head: Normocephalic and atraumatic  Eyes: EOM are normal    Neck: Normal range of motion  Neck supple  Cardiovascular: Normal rate, regular rhythm and normal heart sounds  Pulmonary/Chest: Effort normal and breath sounds normal    Abdominal: Soft  There is no tenderness  There is mild ecchymosis surrounding the umbilicus  The surgical incision is healing nicely  There is no indication of infection  Genitourinary: Rectum normal    Genitourinary Comments: The prostate is 50 g, smooth, non-tender  Musculoskeletal: Normal range of motion  Neurological: He is alert and oriented to person, place, and time  Skin: Skin is warm and dry  Psychiatric: He has a normal mood and affect   His behavior is normal  Judgment and thought content normal  Awake/Alert

## (undated) DEVICE — GLOVE SRG BIOGEL 7

## (undated) DEVICE — 3M™ STERI-STRIP™ REINFORCED ADHESIVE SKIN CLOSURES, R1547, 1/2 IN X 4 IN (12 MM X 100 MM), 6 STRIPS/ENVELOPE: Brand: 3M™ STERI-STRIP™

## (undated) DEVICE — TELFA NON-ADHERENT ABSORBENT DRESSING: Brand: TELFA

## (undated) DEVICE — NEEDLE 25G X 1 1/2

## (undated) DEVICE — INTENDED FOR TISSUE SEPARATION, AND OTHER PROCEDURES THAT REQUIRE A SHARP SURGICAL BLADE TO PUNCTURE OR CUT.: Brand: BARD-PARKER SAFETY BLADES SIZE 15, STERILE

## (undated) DEVICE — CHLORAPREP HI-LITE 26ML ORANGE

## (undated) DEVICE — GLOVE SRG BIOGEL 6.5

## (undated) DEVICE — SCD SEQUENTIAL COMPRESSION COMFORT SLEEVE MEDIUM KNEE LENGTH: Brand: KENDALL SCD

## (undated) DEVICE — SUT PROLENE 1 CT-1 30 IN 8425H

## (undated) DEVICE — 3M™ STERI-STRIP™ COMPOUND BENZOIN TINCTURE 40 BAGS/CARTON 4 CARTONS/CASE C1544: Brand: 3M™ STERI-STRIP™

## (undated) DEVICE — BINDER ABDOMINAL 46-62 IN

## (undated) DEVICE — TUBING SUCTION 5MM X 12 FT

## (undated) DEVICE — SUT MONOCRYL 4-0 PS-2 27 IN Y426H

## (undated) DEVICE — SUT VICRYL 2-0 SH 27 IN UNDYED J417H

## (undated) DEVICE — MEDI-VAC YANKAUER SUCTION HANDLE W/BULBOUS AND CONTROL VENT: Brand: CARDINAL HEALTH

## (undated) DEVICE — SUT VICRYL 3-0 SH 27 IN J416H

## (undated) DEVICE — DRESSING MEPILEX AG BORDER 4 X 4 IN

## (undated) DEVICE — ADHESIVE SKN CLSR HISTOACRYL FLEX 0.5ML LF

## (undated) DEVICE — GLOVE INDICATOR PI UNDERGLOVE SZ 7 BLUE

## (undated) DEVICE — BETHLEHEM UNIVERSAL MINOR GEN: Brand: CARDINAL HEALTH

## (undated) DEVICE — GAUZE SPONGES,16 PLY: Brand: CURITY

## (undated) DEVICE — GLOVE INDICATOR PI UNDERGLOVE SZ 6.5 BLUE

## (undated) DEVICE — PLUMEPEN PRO 10FT

## (undated) DEVICE — 2963 MEDIPORE SOFT CLOTH TAPE 3 IN X 10 YD 12 RLS/CS: Brand: 3M™ MEDIPORE™